# Patient Record
Sex: FEMALE | Race: WHITE | NOT HISPANIC OR LATINO
[De-identification: names, ages, dates, MRNs, and addresses within clinical notes are randomized per-mention and may not be internally consistent; named-entity substitution may affect disease eponyms.]

---

## 2017-04-19 ENCOUNTER — RESULT REVIEW (OUTPATIENT)
Age: 77
End: 2017-04-19

## 2017-06-16 ENCOUNTER — OTHER (OUTPATIENT)
Age: 77
End: 2017-06-16

## 2018-02-16 ENCOUNTER — APPOINTMENT (OUTPATIENT)
Dept: CARDIOLOGY | Facility: CLINIC | Age: 78
End: 2018-02-16
Payer: MEDICARE

## 2018-02-16 ENCOUNTER — NON-APPOINTMENT (OUTPATIENT)
Age: 78
End: 2018-02-16

## 2018-02-16 VITALS
HEART RATE: 67 BPM | BODY MASS INDEX: 22.22 KG/M2 | OXYGEN SATURATION: 99 % | DIASTOLIC BLOOD PRESSURE: 65 MMHG | SYSTOLIC BLOOD PRESSURE: 130 MMHG | WEIGHT: 150 LBS | HEIGHT: 69 IN

## 2018-02-16 DIAGNOSIS — I45.10 UNSPECIFIED RIGHT BUNDLE-BRANCH BLOCK: ICD-10-CM

## 2018-02-16 DIAGNOSIS — R07.9 CHEST PAIN, UNSPECIFIED: ICD-10-CM

## 2018-02-16 DIAGNOSIS — E78.5 HYPERLIPIDEMIA, UNSPECIFIED: ICD-10-CM

## 2018-02-16 PROCEDURE — 99204 OFFICE O/P NEW MOD 45 MIN: CPT

## 2018-02-16 PROCEDURE — 93000 ELECTROCARDIOGRAM COMPLETE: CPT

## 2018-02-16 RX ORDER — METHYLPREDNISOLONE 4 MG/1
4 TABLET ORAL
Qty: 21 | Refills: 0 | Status: COMPLETED | COMMUNITY
Start: 2017-09-07

## 2018-02-16 RX ORDER — IPRATROPIUM BROMIDE 42 UG/1
0.06 SPRAY NASAL
Qty: 15 | Refills: 0 | Status: COMPLETED | COMMUNITY
Start: 2017-08-31

## 2018-02-16 RX ORDER — AMOXICILLIN AND CLAVULANATE POTASSIUM 875; 125 MG/1; MG/1
875-125 TABLET, COATED ORAL
Qty: 10 | Refills: 0 | Status: COMPLETED | COMMUNITY
Start: 2017-08-31

## 2018-02-20 ENCOUNTER — OUTPATIENT (OUTPATIENT)
Dept: OUTPATIENT SERVICES | Facility: HOSPITAL | Age: 78
LOS: 1 days | End: 2018-02-20
Payer: MEDICARE

## 2018-02-20 VITALS
HEIGHT: 69 IN | DIASTOLIC BLOOD PRESSURE: 83 MMHG | OXYGEN SATURATION: 98 % | RESPIRATION RATE: 14 BRPM | HEART RATE: 65 BPM | WEIGHT: 145.06 LBS | TEMPERATURE: 98 F | SYSTOLIC BLOOD PRESSURE: 161 MMHG

## 2018-02-20 DIAGNOSIS — Z98.890 OTHER SPECIFIED POSTPROCEDURAL STATES: Chronic | ICD-10-CM

## 2018-02-20 DIAGNOSIS — I20.0 UNSTABLE ANGINA: ICD-10-CM

## 2018-02-20 LAB
ALBUMIN SERPL ELPH-MCNC: 4.3 G/DL — SIGNIFICANT CHANGE UP (ref 3.3–5)
ALP SERPL-CCNC: 69 U/L — SIGNIFICANT CHANGE UP (ref 40–120)
ALT FLD-CCNC: 21 U/L RC — SIGNIFICANT CHANGE UP (ref 10–45)
ANION GAP SERPL CALC-SCNC: 11 MMOL/L — SIGNIFICANT CHANGE UP (ref 5–17)
AST SERPL-CCNC: 27 U/L — SIGNIFICANT CHANGE UP (ref 10–40)
BILIRUB SERPL-MCNC: 0.3 MG/DL — SIGNIFICANT CHANGE UP (ref 0.2–1.2)
BUN SERPL-MCNC: 17 MG/DL — SIGNIFICANT CHANGE UP (ref 7–23)
CALCIUM SERPL-MCNC: 9.3 MG/DL — SIGNIFICANT CHANGE UP (ref 8.4–10.5)
CHLORIDE SERPL-SCNC: 104 MMOL/L — SIGNIFICANT CHANGE UP (ref 96–108)
CO2 SERPL-SCNC: 28 MMOL/L — SIGNIFICANT CHANGE UP (ref 22–31)
CREAT SERPL-MCNC: 0.98 MG/DL — SIGNIFICANT CHANGE UP (ref 0.5–1.3)
GLUCOSE SERPL-MCNC: 91 MG/DL — SIGNIFICANT CHANGE UP (ref 70–99)
HCT VFR BLD CALC: 41.4 % — SIGNIFICANT CHANGE UP (ref 34.5–45)
HGB BLD-MCNC: 13.5 G/DL — SIGNIFICANT CHANGE UP (ref 11.5–15.5)
MCHC RBC-ENTMCNC: 29.4 PG — SIGNIFICANT CHANGE UP (ref 27–34)
MCHC RBC-ENTMCNC: 32.5 GM/DL — SIGNIFICANT CHANGE UP (ref 32–36)
MCV RBC AUTO: 90.4 FL — SIGNIFICANT CHANGE UP (ref 80–100)
PLATELET # BLD AUTO: 220 K/UL — SIGNIFICANT CHANGE UP (ref 150–400)
POTASSIUM SERPL-MCNC: 4.2 MMOL/L — SIGNIFICANT CHANGE UP (ref 3.5–5.3)
POTASSIUM SERPL-SCNC: 4.2 MMOL/L — SIGNIFICANT CHANGE UP (ref 3.5–5.3)
PROT SERPL-MCNC: 7.3 G/DL — SIGNIFICANT CHANGE UP (ref 6–8.3)
RBC # BLD: 4.58 M/UL — SIGNIFICANT CHANGE UP (ref 3.8–5.2)
RBC # FLD: 11.4 % — SIGNIFICANT CHANGE UP (ref 10.3–14.5)
SODIUM SERPL-SCNC: 143 MMOL/L — SIGNIFICANT CHANGE UP (ref 135–145)
WBC # BLD: 4.9 K/UL — SIGNIFICANT CHANGE UP (ref 3.8–10.5)
WBC # FLD AUTO: 4.9 K/UL — SIGNIFICANT CHANGE UP (ref 3.8–10.5)

## 2018-02-20 PROCEDURE — 93458 L HRT ARTERY/VENTRICLE ANGIO: CPT

## 2018-02-20 PROCEDURE — 80053 COMPREHEN METABOLIC PANEL: CPT

## 2018-02-20 PROCEDURE — 99152 MOD SED SAME PHYS/QHP 5/>YRS: CPT

## 2018-02-20 PROCEDURE — C1887: CPT

## 2018-02-20 PROCEDURE — C1769: CPT

## 2018-02-20 PROCEDURE — 85027 COMPLETE CBC AUTOMATED: CPT

## 2018-02-20 PROCEDURE — C1894: CPT

## 2018-02-20 PROCEDURE — 93458 L HRT ARTERY/VENTRICLE ANGIO: CPT | Mod: 26

## 2018-02-20 RX ORDER — SIMVASTATIN 20 MG/1
1 TABLET, FILM COATED ORAL
Qty: 0 | Refills: 0 | COMMUNITY

## 2018-02-20 RX ORDER — ASPIRIN/CALCIUM CARB/MAGNESIUM 324 MG
1 TABLET ORAL
Qty: 0 | Refills: 0 | COMMUNITY

## 2018-02-20 RX ORDER — LEVOTHYROXINE SODIUM 125 MCG
1 TABLET ORAL
Qty: 0 | Refills: 0 | COMMUNITY

## 2018-02-20 NOTE — H&P CARDIOLOGY - HISTORY OF PRESENT ILLNESS
78 yo female PMH hypothyroidism, breast CA s/p mastectomy, osteoporosis and HLD presents today for cardiac cath. Patient reports intermittent episodes of midsternal chest pressure with exertion x 2-3 months, mostly when participating in aerobic exercise and swimming. Symptoms resolves with rest and intermittently radiated to BL UE. Patient was seen and evaluated by Dr. Joseph ([facility])- recommended cardiac cath for further evaluation for obstructive CAD. Patient currently denies chest pain, palpitations, SOB, PND, orthopnea. 78 yo female PMH hypothyroidism, breast CA s/p mastectomy, osteoporosis and HLD presents today for cardiac cath. Patient reports intermittent episodes of midsternal chest pressure with exertion x 2-3 months, mostly when participating in aerobic exercise and swimming. Symptoms resolves with rest and intermittently radiated to BL UE. Patient was seen and evaluated by Dr. Joseph ([facility])- recommended cardiac cath for further evaluation for obstructive CAD. Patient currently denies chest pain, palpitations, SOB, PND, orthopnea.    Patient reports stress test 2 years- reports negative results- does not recall the cardiologist's name 78 yo female PMH hypothyroidism, breast CA s/p mastectomy, osteoporosis and HLD presents today for cardiac cath. Patient reports intermittent episodes of midsternal chest pressure with exertion x 2-3 months, mostly when participating in aerobic exercise and swimming. Symptoms resolves with rest and intermittently radiated to BL UE. Patient was seen and evaluated by Dr. Joseph ([facility])- recommended cardiac cath for further evaluation for obstructive CAD. Patient currently denies chest pain, palpitations, SOB, PND, orthopnea.  patient reports cold like symptoms started on last Friday  Patient reports stress test 2 years- reports negative results- does not recall the cardiologist's name

## 2018-02-20 NOTE — H&P CARDIOLOGY - PMH
Breast cancer    CAD (coronary artery disease)    HLD (hyperlipidemia)    Hypothyroidism    Osteoporosis

## 2018-02-20 NOTE — H&P CARDIOLOGY - PSH
H/O abdominal hysterectomy    H/O mastectomy    History of hip surgery H/O abdominal hysterectomy  total  H/O mastectomy    History of hip surgery  left hip  2012

## 2018-03-02 ENCOUNTER — APPOINTMENT (OUTPATIENT)
Dept: CARDIOLOGY | Facility: CLINIC | Age: 78
End: 2018-03-02
Payer: MEDICARE

## 2018-03-02 PROCEDURE — 93306 TTE W/DOPPLER COMPLETE: CPT

## 2018-03-21 ENCOUNTER — NON-APPOINTMENT (OUTPATIENT)
Age: 78
End: 2018-03-21

## 2018-03-21 ENCOUNTER — TRANSCRIPTION ENCOUNTER (OUTPATIENT)
Age: 78
End: 2018-03-21

## 2018-03-21 ENCOUNTER — APPOINTMENT (OUTPATIENT)
Dept: CARDIOLOGY | Facility: CLINIC | Age: 78
End: 2018-03-21
Payer: MEDICARE

## 2018-03-21 VITALS
WEIGHT: 150 LBS | BODY MASS INDEX: 22.22 KG/M2 | SYSTOLIC BLOOD PRESSURE: 134 MMHG | OXYGEN SATURATION: 99 % | HEIGHT: 69 IN | DIASTOLIC BLOOD PRESSURE: 70 MMHG | HEART RATE: 60 BPM

## 2018-03-21 PROCEDURE — 93000 ELECTROCARDIOGRAM COMPLETE: CPT

## 2018-03-21 PROCEDURE — 99214 OFFICE O/P EST MOD 30 MIN: CPT

## 2018-04-23 ENCOUNTER — APPOINTMENT (OUTPATIENT)
Dept: CARDIOLOGY | Facility: CLINIC | Age: 78
End: 2018-04-23
Payer: MEDICARE

## 2018-04-23 ENCOUNTER — NON-APPOINTMENT (OUTPATIENT)
Age: 78
End: 2018-04-23

## 2018-04-23 VITALS
BODY MASS INDEX: 21.92 KG/M2 | OXYGEN SATURATION: 91 % | HEIGHT: 69 IN | DIASTOLIC BLOOD PRESSURE: 76 MMHG | SYSTOLIC BLOOD PRESSURE: 149 MMHG | HEART RATE: 71 BPM | WEIGHT: 148 LBS

## 2018-04-23 DIAGNOSIS — R06.02 SHORTNESS OF BREATH: ICD-10-CM

## 2018-04-23 DIAGNOSIS — M79.606 PAIN IN LEG, UNSPECIFIED: ICD-10-CM

## 2018-04-23 DIAGNOSIS — R94.31 ABNORMAL ELECTROCARDIOGRAM [ECG] [EKG]: ICD-10-CM

## 2018-04-23 PROCEDURE — 99214 OFFICE O/P EST MOD 30 MIN: CPT

## 2018-04-23 PROCEDURE — 93000 ELECTROCARDIOGRAM COMPLETE: CPT

## 2018-04-26 ENCOUNTER — APPOINTMENT (OUTPATIENT)
Dept: CARDIOLOGY | Facility: CLINIC | Age: 78
End: 2018-04-26
Payer: MEDICARE

## 2018-04-26 PROCEDURE — 93970 EXTREMITY STUDY: CPT

## 2018-04-27 ENCOUNTER — RESULT REVIEW (OUTPATIENT)
Age: 78
End: 2018-04-27

## 2018-07-23 PROBLEM — R94.31 ABNORMAL ELECTROCARDIOGRAM: Status: ACTIVE | Noted: 2018-04-23

## 2018-08-09 PROBLEM — E78.5 HYPERLIPIDEMIA, UNSPECIFIED: Chronic | Status: ACTIVE | Noted: 2018-02-20

## 2018-08-09 PROBLEM — M81.0 AGE-RELATED OSTEOPOROSIS WITHOUT CURRENT PATHOLOGICAL FRACTURE: Chronic | Status: ACTIVE | Noted: 2018-02-20

## 2018-08-09 PROBLEM — C50.919 MALIGNANT NEOPLASM OF UNSPECIFIED SITE OF UNSPECIFIED FEMALE BREAST: Chronic | Status: ACTIVE | Noted: 2018-02-20

## 2018-08-09 PROBLEM — I25.10 ATHEROSCLEROTIC HEART DISEASE OF NATIVE CORONARY ARTERY WITHOUT ANGINA PECTORIS: Chronic | Status: ACTIVE | Noted: 2018-02-20

## 2018-08-09 PROBLEM — E03.9 HYPOTHYROIDISM, UNSPECIFIED: Chronic | Status: ACTIVE | Noted: 2018-02-20

## 2018-08-13 ENCOUNTER — NON-APPOINTMENT (OUTPATIENT)
Age: 78
End: 2018-08-13

## 2018-08-13 ENCOUNTER — APPOINTMENT (OUTPATIENT)
Dept: CARDIOLOGY | Facility: CLINIC | Age: 78
End: 2018-08-13
Payer: MEDICARE

## 2018-08-13 VITALS — DIASTOLIC BLOOD PRESSURE: 77 MMHG | SYSTOLIC BLOOD PRESSURE: 134 MMHG

## 2018-08-13 VITALS — HEIGHT: 69 IN | WEIGHT: 147 LBS | OXYGEN SATURATION: 96 % | HEART RATE: 55 BPM | BODY MASS INDEX: 21.77 KG/M2

## 2018-08-13 PROCEDURE — 99214 OFFICE O/P EST MOD 30 MIN: CPT

## 2018-08-13 PROCEDURE — 93000 ELECTROCARDIOGRAM COMPLETE: CPT

## 2018-10-09 ENCOUNTER — APPOINTMENT (OUTPATIENT)
Dept: CARDIOLOGY | Facility: CLINIC | Age: 78
End: 2018-10-09
Payer: MEDICARE

## 2018-10-09 PROCEDURE — 93306 TTE W/DOPPLER COMPLETE: CPT

## 2019-03-19 ENCOUNTER — TRANSCRIPTION ENCOUNTER (OUTPATIENT)
Age: 79
End: 2019-03-19

## 2019-03-25 ENCOUNTER — NON-APPOINTMENT (OUTPATIENT)
Age: 79
End: 2019-03-25

## 2019-03-25 ENCOUNTER — APPOINTMENT (OUTPATIENT)
Dept: CARDIOLOGY | Facility: CLINIC | Age: 79
End: 2019-03-25
Payer: MEDICARE

## 2019-03-25 VITALS
OXYGEN SATURATION: 97 % | SYSTOLIC BLOOD PRESSURE: 127 MMHG | BODY MASS INDEX: 21.77 KG/M2 | HEIGHT: 69 IN | DIASTOLIC BLOOD PRESSURE: 65 MMHG | HEART RATE: 55 BPM | WEIGHT: 147 LBS

## 2019-03-25 PROCEDURE — 93000 ELECTROCARDIOGRAM COMPLETE: CPT

## 2019-03-25 PROCEDURE — 99214 OFFICE O/P EST MOD 30 MIN: CPT

## 2019-03-25 NOTE — REVIEW OF SYSTEMS
[Fever] : no fever [Headache] : no headache [Recent Weight Gain (___ Lbs)] : no recent weight gain [Chills] : no chills [Feeling Fatigued] : feeling fatigued [Recent Weight Loss (___ Lbs)] : no recent weight loss [see HPI] : see HPI [Negative] : Heme/Lymph

## 2019-03-25 NOTE — DISCUSSION/SUMMARY
[___ Month(s)] : [unfilled] month(s) [With Me] : with me [FreeTextEntry1] : Mrs. Hinojosa is a 78-year-old female with breast CA status post mastectomy, osteoporosis, hypothyroidism, and hyperlipidemia, here for follow up evaluation.\par At prior visit, she was sent for a cardiac cath because of exertional symptoms, and was found to have non-obstructive CAD. An echocardiogram showed bileaflet MV prolapse with mild-mod MR, at least mod AI and normal LV function. This was confirmed on more recent echocardiogram in 10/2018.\par \par Her EKG is a sinus rhythm with a right bundle branch block, and no sign of ischemia. Her blood pressure has been at goal.\par \par Her AI seems to be in the moderate range, and her LV function is normal. She may have elevated left sided pressures from this though, which is why she has felt better with Lasix. We again discussed the option of pursuing a OC to further evaluate her Aortic regurgitation and mitral valve prolapse. This will be scheduled when she returns from [facility].\par She will call with any issues or new symptoms. She will continue to aspirin and her statin for her nonobstructive CAD. She will followup with me in 3 months.\par There is no cardiac contraindication for her to start exercising in her exercise program.\par

## 2019-03-25 NOTE — HISTORY OF PRESENT ILLNESS
[FreeTextEntry1] : Mrs. Hinojosa is a 78-year-old female with breast CA status post mastectomy, osteoporosis, hypothyroidism, and hyperlipidemia, here for follow up evaluation.\par \par  At prior visits, she reported a decrease in her exercise tolerance and exertional chest pain. She was sent for a cardiac catheterization which showed nonobstructive CAD, with a 20% mid LAD lesion. She was also sent for echocardiogram which showed bileaflet mitral valve prolapse with mild to moderate mitral regurgitation, and moderate aortic regurgitation. Her left ventricle systolic function was normal.\par Because of her shortness of breath I started her on Lasix 20 mg p.o. daily. She began to feel better and lost 3 pounds. \par \par I last saw her in 8/2018. Since this time, she is feeling well. She reports lower extremity swelling, when she is on her feet all day. This usually resolves with leg elevation. She if off Lasix\par She denies chest pain and difficulty breathing at current time. She does report occasional shortness of breath on exertion, and is not able to swim as much.\par She is generally very active person. Repeat echo in 10/2018 with at least moderate AI, and mild- moderate MR.\par \par She denies toxic habits. She is a nonsmoker. She currently takes simvastatin for her hyperlipidemia. There is a strong family history of coronary artery disease

## 2019-03-25 NOTE — PHYSICAL EXAM
[General Appearance - Well Developed] : well developed [Normal Appearance] : normal appearance [Well Groomed] : well groomed [General Appearance - Well Nourished] : well nourished [No Deformities] : no deformities [General Appearance - In No Acute Distress] : no acute distress [Normal Conjunctiva] : the conjunctiva exhibited no abnormalities [Eyelids - No Xanthelasma] : the eyelids demonstrated no xanthelasmas [Normal Oral Mucosa] : normal oral mucosa [No Oral Pallor] : no oral pallor [No Oral Cyanosis] : no oral cyanosis [Normal Jugular Venous A Waves Present] : normal jugular venous A waves present [Normal Jugular Venous V Waves Present] : normal jugular venous V waves present [No Jugular Venous Caal A Waves] : no jugular venous caal A waves [Respiration, Rhythm And Depth] : normal respiratory rhythm and effort [Exaggerated Use Of Accessory Muscles For Inspiration] : no accessory muscle use [Auscultation Breath Sounds / Voice Sounds] : lungs were clear to auscultation bilaterally [Abdomen Soft] : soft [Abdomen Tenderness] : non-tender [Abdomen Mass (___ Cm)] : no abdominal mass palpated [Abnormal Walk] : normal gait [Gait - Sufficient For Exercise Testing] : the gait was sufficient for exercise testing [Nail Clubbing] : no clubbing of the fingernails [Cyanosis, Localized] : no localized cyanosis [Petechial Hemorrhages (___cm)] : no petechial hemorrhages [Skin Color & Pigmentation] : normal skin color and pigmentation [] : no rash [No Venous Stasis] : no venous stasis [Skin Lesions] : no skin lesions [No Skin Ulcers] : no skin ulcer [No Xanthoma] : no  xanthoma was observed [Oriented To Time, Place, And Person] : oriented to person, place, and time [Affect] : the affect was normal [Mood] : the mood was normal [No Anxiety] : not feeling anxious [Normal Rate] : normal [Normal S1] : normal S1 [Normal S2] : normal S2 [S3] : no S3 [S4] : no S4 [II] : a grade 2 [Right Carotid Bruit] : no bruit heard over the right carotid [Left Carotid Bruit] : no bruit heard over the left carotid [Right Femoral Bruit] : no bruit heard over the right femoral artery [Left Femoral Bruit] : no bruit heard over the left femoral artery [2+] : left 2+ [No Abnormalities] : the abdominal aorta was not enlarged and no bruit was heard [___ +] : bilateral [unfilled]U+ pitting edema to the ankles

## 2019-04-12 ENCOUNTER — APPOINTMENT (OUTPATIENT)
Dept: CV DIAGNOSITCS | Facility: HOSPITAL | Age: 79
End: 2019-04-12

## 2019-04-12 ENCOUNTER — OUTPATIENT (OUTPATIENT)
Dept: OUTPATIENT SERVICES | Facility: HOSPITAL | Age: 79
LOS: 1 days | End: 2019-04-12
Payer: MEDICARE

## 2019-04-12 DIAGNOSIS — Z98.890 OTHER SPECIFIED POSTPROCEDURAL STATES: Chronic | ICD-10-CM

## 2019-04-12 DIAGNOSIS — I25.10 ATHEROSCLEROTIC HEART DISEASE OF NATIVE CORONARY ARTERY WITHOUT ANGINA PECTORIS: ICD-10-CM

## 2019-04-12 PROCEDURE — 93306 TTE W/DOPPLER COMPLETE: CPT | Mod: 26

## 2019-04-12 PROCEDURE — 93306 TTE W/DOPPLER COMPLETE: CPT

## 2019-04-12 PROCEDURE — 93312 ECHO TRANSESOPHAGEAL: CPT | Mod: 26

## 2019-04-12 PROCEDURE — 93312 ECHO TRANSESOPHAGEAL: CPT

## 2019-05-20 ENCOUNTER — RESULT REVIEW (OUTPATIENT)
Age: 79
End: 2019-05-20

## 2020-04-02 ENCOUNTER — APPOINTMENT (OUTPATIENT)
Dept: CARDIOLOGY | Facility: CLINIC | Age: 80
End: 2020-04-02

## 2020-05-26 LAB
BASOPHILS # BLD AUTO: 0.03 K/UL
BASOPHILS NFR BLD AUTO: 0.6 %
EOSINOPHIL # BLD AUTO: 0.25 K/UL
EOSINOPHIL NFR BLD AUTO: 4.7 %
HCT VFR BLD CALC: 37.8 %
HGB BLD-MCNC: 12.4 G/DL
IMM GRANULOCYTES NFR BLD AUTO: 0.4 %
LYMPHOCYTES # BLD AUTO: 2.59 K/UL
LYMPHOCYTES NFR BLD AUTO: 48.3 %
MAN DIFF?: NORMAL
MCHC RBC-ENTMCNC: 29 PG
MCHC RBC-ENTMCNC: 32.8 GM/DL
MCV RBC AUTO: 88.5 FL
MONOCYTES # BLD AUTO: 0.42 K/UL
MONOCYTES NFR BLD AUTO: 7.8 %
NEUTROPHILS # BLD AUTO: 2.05 K/UL
NEUTROPHILS NFR BLD AUTO: 38.2 %
PLATELET # BLD AUTO: 263 K/UL
RBC # BLD: 4.27 M/UL
RBC # FLD: 12.3 %
SARS-COV-2 IGG SERPL IA-ACNC: 0.1 INDEX
SARS-COV-2 IGG SERPL QL IA: NEGATIVE
WBC # FLD AUTO: 5.36 K/UL

## 2020-05-27 LAB
25(OH)D3 SERPL-MCNC: 33 NG/ML
ALBUMIN SERPL ELPH-MCNC: 4.3 G/DL
ALP BLD-CCNC: 67 U/L
ALT SERPL-CCNC: 16 U/L
ANION GAP SERPL CALC-SCNC: 12 MMOL/L
AST SERPL-CCNC: 23 U/L
BILIRUB SERPL-MCNC: 0.3 MG/DL
BUN SERPL-MCNC: 19 MG/DL
CALCIUM SERPL-MCNC: 8.9 MG/DL
CHLORIDE SERPL-SCNC: 104 MMOL/L
CHOLEST SERPL-MCNC: 184 MG/DL
CHOLEST/HDLC SERPL: 1.9 RATIO
CO2 SERPL-SCNC: 24 MMOL/L
CREAT SERPL-MCNC: 0.92 MG/DL
ESTIMATED AVERAGE GLUCOSE: 111 MG/DL
GLUCOSE SERPL-MCNC: 96 MG/DL
HBA1C MFR BLD HPLC: 5.5 %
HDLC SERPL-MCNC: 96 MG/DL
LDLC SERPL CALC-MCNC: 79 MG/DL
POTASSIUM SERPL-SCNC: 4 MMOL/L
PROT SERPL-MCNC: 6.1 G/DL
SODIUM SERPL-SCNC: 141 MMOL/L
TRIGL SERPL-MCNC: 48 MG/DL
TSH SERPL-ACNC: 5.95 UIU/ML

## 2020-06-23 ENCOUNTER — APPOINTMENT (OUTPATIENT)
Dept: CARDIOLOGY | Facility: CLINIC | Age: 80
End: 2020-06-23
Payer: MEDICARE

## 2020-06-23 ENCOUNTER — NON-APPOINTMENT (OUTPATIENT)
Age: 80
End: 2020-06-23

## 2020-06-23 VITALS
BODY MASS INDEX: 21.18 KG/M2 | OXYGEN SATURATION: 97 % | SYSTOLIC BLOOD PRESSURE: 112 MMHG | DIASTOLIC BLOOD PRESSURE: 67 MMHG | HEIGHT: 69 IN | HEART RATE: 57 BPM | WEIGHT: 143 LBS

## 2020-06-23 PROCEDURE — 93000 ELECTROCARDIOGRAM COMPLETE: CPT

## 2020-06-23 PROCEDURE — 99214 OFFICE O/P EST MOD 30 MIN: CPT

## 2020-06-23 NOTE — REVIEW OF SYSTEMS
[Fever] : no fever [Headache] : no headache [Recent Weight Gain (___ Lbs)] : no recent weight gain [Chills] : no chills [see HPI] : see HPI [Feeling Fatigued] : feeling fatigued [Recent Weight Loss (___ Lbs)] : no recent weight loss [Negative] : Heme/Lymph

## 2020-06-23 NOTE — HISTORY OF PRESENT ILLNESS
[FreeTextEntry1] : Mrs. Hinojosa is a 79-year-old female with breast CA status post mastectomy, osteoporosis, hypothyroidism, and hyperlipidemia, here for follow up evaluation.\par \par  At prior visits, she reported a decrease in her exercise tolerance and exertional chest pain. She was sent for a cardiac catheterization which showed nonobstructive CAD, with a 20% mid LAD lesion. She was also sent for echocardiogram which showed bileaflet mitral valve prolapse with mild to moderate mitral regurgitation, and moderate aortic regurgitation. Her left ventricle systolic function was normal.\par \par I last saw her in 9/2019. \par She denies chest pain and difficulty breathing at current time. She has not been swimming because of the pandemic. She does report some fatigue.\par She is generally very active person. Repeat echo in 10/2018 with at least moderate AI, and mild- moderate MR. OC with moderate AR and mild MR with bileaflet prolapse.\par Since last visit, she did have a episode of chest tightness that woke her up. She went to see a cardiologist while I was redeployed, and reports a normal pharmacologic stress test. 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 Recent blood work was notable for an LDL of 79.\par \par She denies toxic habits. She is a nonsmoker. She currently takes simvastatin for her hyperlipidemia. There is a strong family history of coronary artery disease

## 2020-06-23 NOTE — PHYSICAL EXAM
[Normal Appearance] : normal appearance [General Appearance - Well Developed] : well developed [General Appearance - Well Nourished] : well nourished [No Deformities] : no deformities [Well Groomed] : well groomed [Normal Conjunctiva] : the conjunctiva exhibited no abnormalities [General Appearance - In No Acute Distress] : no acute distress [Eyelids - No Xanthelasma] : the eyelids demonstrated no xanthelasmas [Normal Oral Mucosa] : normal oral mucosa [No Oral Cyanosis] : no oral cyanosis [No Oral Pallor] : no oral pallor [Normal Jugular Venous V Waves Present] : normal jugular venous V waves present [Normal Jugular Venous A Waves Present] : normal jugular venous A waves present [Respiration, Rhythm And Depth] : normal respiratory rhythm and effort [No Jugular Venous Caal A Waves] : no jugular venous caal A waves [Exaggerated Use Of Accessory Muscles For Inspiration] : no accessory muscle use [Auscultation Breath Sounds / Voice Sounds] : lungs were clear to auscultation bilaterally [Abdomen Soft] : soft [Abdomen Tenderness] : non-tender [Abdomen Mass (___ Cm)] : no abdominal mass palpated [Abnormal Walk] : normal gait [Gait - Sufficient For Exercise Testing] : the gait was sufficient for exercise testing [Nail Clubbing] : no clubbing of the fingernails [Cyanosis, Localized] : no localized cyanosis [Petechial Hemorrhages (___cm)] : no petechial hemorrhages [Skin Color & Pigmentation] : normal skin color and pigmentation [No Venous Stasis] : no venous stasis [] : no rash [Skin Lesions] : no skin lesions [No Skin Ulcers] : no skin ulcer [No Xanthoma] : no  xanthoma was observed [Oriented To Time, Place, And Person] : oriented to person, place, and time [Affect] : the affect was normal [Mood] : the mood was normal [No Anxiety] : not feeling anxious [Normal S1] : normal S1 [Normal Rate] : normal [Normal S2] : normal S2 [S4] : no S4 [S3] : no S3 [II] : a grade 2 [I] : a grade 1 [Left Carotid Bruit] : no bruit heard over the left carotid [Right Carotid Bruit] : no bruit heard over the right carotid [Right Femoral Bruit] : no bruit heard over the right femoral artery [Left Femoral Bruit] : no bruit heard over the left femoral artery [2+] : right 2+ [No Pitting Edema] : no pitting edema present [No Abnormalities] : the abdominal aorta was not enlarged and no bruit was heard

## 2020-06-23 NOTE — DISCUSSION/SUMMARY
[___ Month(s)] : [unfilled] month(s) [With Me] : with me [FreeTextEntry1] : Mrs. Hinojosa is a 80-year-old female with breast CA status post mastectomy, osteoporosis, hypothyroidism, and hyperlipidemia, here for follow up evaluation.\par At prior visit, she was sent for a cardiac cath because of exertional symptoms, and was found to have non-obstructive CAD. She has moderate AR and mild MR on a OC in 2019.\par \par Her EKG is a sinus rhythm with a right bundle branch block, and no sign of ischemia, unchanged from previous tracings. Her blood pressure has been at goal.\par \par She will get me the records of her recent stress test. If she had a repeat echocardiogram, we will not repeat one at this time. If none done, she will have a repeat to evaluate for changes in LV function and LV dimensions in the setting of AR.\par \par She will call with any issues or new symptoms. She will continue to aspirin and her statin for her nonobstructive CAD. Her most recent LDL was at goal\par I will her again in 6 months time.

## 2020-09-18 ENCOUNTER — RESULT REVIEW (OUTPATIENT)
Age: 80
End: 2020-09-18

## 2020-09-18 ENCOUNTER — APPOINTMENT (OUTPATIENT)
Dept: CARDIOLOGY | Facility: CLINIC | Age: 80
End: 2020-09-18
Payer: MEDICARE

## 2020-09-18 PROCEDURE — 93306 TTE W/DOPPLER COMPLETE: CPT

## 2020-12-08 ENCOUNTER — NON-APPOINTMENT (OUTPATIENT)
Age: 80
End: 2020-12-08

## 2020-12-08 ENCOUNTER — APPOINTMENT (OUTPATIENT)
Dept: CARDIOLOGY | Facility: CLINIC | Age: 80
End: 2020-12-08
Payer: MEDICARE

## 2020-12-08 VITALS
DIASTOLIC BLOOD PRESSURE: 62 MMHG | SYSTOLIC BLOOD PRESSURE: 148 MMHG | OXYGEN SATURATION: 100 % | HEIGHT: 69 IN | BODY MASS INDEX: 21.48 KG/M2 | HEART RATE: 48 BPM | WEIGHT: 145 LBS

## 2020-12-08 DIAGNOSIS — R94.31 ABNORMAL ELECTROCARDIOGRAM [ECG] [EKG]: ICD-10-CM

## 2020-12-08 DIAGNOSIS — I35.1 NONRHEUMATIC AORTIC (VALVE) INSUFFICIENCY: ICD-10-CM

## 2020-12-08 PROCEDURE — 99214 OFFICE O/P EST MOD 30 MIN: CPT

## 2020-12-08 PROCEDURE — 93000 ELECTROCARDIOGRAM COMPLETE: CPT

## 2020-12-08 NOTE — REVIEW OF SYSTEMS
[Fever] : no fever [Headache] : no headache [Recent Weight Gain (___ Lbs)] : no recent weight gain [Chills] : no chills [Recent Weight Loss (___ Lbs)] : no recent weight loss [see HPI] : see HPI [Negative] : Heme/Lymph

## 2020-12-08 NOTE — PHYSICAL EXAM
[General Appearance - Well Developed] : well developed [Normal Appearance] : normal appearance [Well Groomed] : well groomed [General Appearance - Well Nourished] : well nourished [No Deformities] : no deformities [General Appearance - In No Acute Distress] : no acute distress [Normal Conjunctiva] : the conjunctiva exhibited no abnormalities [Eyelids - No Xanthelasma] : the eyelids demonstrated no xanthelasmas [Normal Oral Mucosa] : normal oral mucosa [No Oral Pallor] : no oral pallor [No Oral Cyanosis] : no oral cyanosis [Normal Jugular Venous A Waves Present] : normal jugular venous A waves present [Normal Jugular Venous V Waves Present] : normal jugular venous V waves present [No Jugular Venous Caal A Waves] : no jugular venous caal A waves [Respiration, Rhythm And Depth] : normal respiratory rhythm and effort [Exaggerated Use Of Accessory Muscles For Inspiration] : no accessory muscle use [Auscultation Breath Sounds / Voice Sounds] : lungs were clear to auscultation bilaterally [Abdomen Soft] : soft [Abdomen Tenderness] : non-tender [Abdomen Mass (___ Cm)] : no abdominal mass palpated [Abnormal Walk] : normal gait [Gait - Sufficient For Exercise Testing] : the gait was sufficient for exercise testing [Nail Clubbing] : no clubbing of the fingernails [Cyanosis, Localized] : no localized cyanosis [Petechial Hemorrhages (___cm)] : no petechial hemorrhages [Skin Color & Pigmentation] : normal skin color and pigmentation [] : no rash [No Venous Stasis] : no venous stasis [Skin Lesions] : no skin lesions [No Skin Ulcers] : no skin ulcer [No Xanthoma] : no  xanthoma was observed [Oriented To Time, Place, And Person] : oriented to person, place, and time [Affect] : the affect was normal [Mood] : the mood was normal [No Anxiety] : not feeling anxious [Normal Rate] : normal [Normal S1] : normal S1 [Normal S2] : normal S2 [S3] : no S3 [S4] : no S4 [II] : a grade 2 [I] : a grade 1 [Right Carotid Bruit] : no bruit heard over the right carotid [Left Carotid Bruit] : no bruit heard over the left carotid [Right Femoral Bruit] : no bruit heard over the right femoral artery [Left Femoral Bruit] : no bruit heard over the left femoral artery [2+] : left 2+ [No Abnormalities] : the abdominal aorta was not enlarged and no bruit was heard [No Pitting Edema] : no pitting edema present

## 2020-12-08 NOTE — HISTORY OF PRESENT ILLNESS
[FreeTextEntry1] : Mrs. Hinojosa is a 80-year-old female with breast CA status post mastectomy, osteoporosis, hypothyroidism, and hyperlipidemia, here for follow up evaluation.\par \par  At prior visits, she reported a decrease in her exercise tolerance and exertional chest pain. She was sent for a cardiac catheterization which showed nonobstructive CAD, with a 20% mid LAD lesion. She was also sent for echocardiogram which showed bileaflet mitral valve prolapse with mild to moderate mitral regurgitation, and moderate aortic regurgitation. Her left ventricle systolic function was normal.\par \par I last saw her in 6/2020.\par She denies chest pain and difficulty breathing at current time. She is back to swimming.\par She is generally very active person. Repeat echo in 9/020 with at moderate AI, and moderate MR with bileaflet prolapse. LV dimensions and LV function were normal.\par \par She did have chest pain earlier in 2020 episode of chest tightness that woke her up. She went to see a cardiologist while I was redeployed, and reports a normal pharmacologic stress test. 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 Recent blood work was notable for an LDL of 79.\par \par She denies toxic habits. She is a nonsmoker. She currently takes simvastatin for her hyperlipidemia. There is a strong family history of coronary artery disease

## 2020-12-08 NOTE — DISCUSSION/SUMMARY
[___ Month(s)] : [unfilled] month(s) [With Me] : with me [FreeTextEntry1] : Mrs. Hinojosa is a 80-year-old female with breast CA status post mastectomy, osteoporosis, hypothyroidism, and hyperlipidemia, here for follow up evaluation.\par \par At prior visit, she was sent for a cardiac cath because of exertional symptoms, and was found to have non-obstructive CAD. She has moderate AR and moderate MR on recent echocardiogram with normal LV function and wall dimensions.\par \par Her EKG is a sinus rhythm with a right bundle branch block, and no sign of ischemia, unchanged from previous tracings. Her blood pressure is elevated, though this seems to be an aberration. She will check her numbers at home.\par \par She will call with any issues or new symptoms. She will continue to aspirin and her statin for her nonobstructive CAD. Her most recent LDL was at goal.\par I will her again in 6 months time.

## 2021-05-11 ENCOUNTER — APPOINTMENT (OUTPATIENT)
Dept: CARDIOLOGY | Facility: CLINIC | Age: 81
End: 2021-05-11
Payer: MEDICARE

## 2021-05-11 ENCOUNTER — NON-APPOINTMENT (OUTPATIENT)
Age: 81
End: 2021-05-11

## 2021-05-11 VITALS
HEIGHT: 69 IN | SYSTOLIC BLOOD PRESSURE: 144 MMHG | WEIGHT: 148 LBS | DIASTOLIC BLOOD PRESSURE: 75 MMHG | OXYGEN SATURATION: 98 % | BODY MASS INDEX: 21.92 KG/M2 | HEART RATE: 58 BPM

## 2021-05-11 VITALS — DIASTOLIC BLOOD PRESSURE: 73 MMHG | SYSTOLIC BLOOD PRESSURE: 128 MMHG

## 2021-05-11 DIAGNOSIS — R09.89 OTHER SPECIFIED SYMPTOMS AND SIGNS INVOLVING THE CIRCULATORY AND RESPIRATORY SYSTEMS: ICD-10-CM

## 2021-05-11 DIAGNOSIS — I65.29 OCCLUSION AND STENOSIS OF UNSPECIFIED CAROTID ARTERY: ICD-10-CM

## 2021-05-11 PROCEDURE — 93000 ELECTROCARDIOGRAM COMPLETE: CPT

## 2021-05-11 PROCEDURE — 99214 OFFICE O/P EST MOD 30 MIN: CPT

## 2021-05-11 NOTE — DISCUSSION/SUMMARY
[With Me] : with me [___ Month(s)] : in [unfilled] month(s) [FreeTextEntry1] : Mrs. Hinojosa is a 80-year-old female with breast CA status post mastectomy, osteoporosis, hypothyroidism, and hyperlipidemia, here for follow up evaluation.\par \par At prior visit, she was sent for a cardiac cath because of exertional symptoms, and was found to have non-obstructive CAD. She has moderate AR and moderate MR on recent echocardiogram with normal LV function and wall dimensions.\par Her EKG is a sinus rhythm with a right bundle branch block, and no sign of ischemia, unchanged from previous tracings. Her blood pressure is better controlled.\par \par She will call with any issues or new symptoms. She will continue to aspirin and her statin for her nonobstructive CAD and carotid disease. Her most recent LDL was at goal.\par \par She does report some claudication like leg heaviness of her LLE, and her pulse is less palpable than her right leg. She will have a le arterial doppler for further evaluation.\par We will repeat her carotids and echocardiogram this summer.\par I will see her again in 3 months.

## 2021-05-11 NOTE — PHYSICAL EXAM
[Well Developed] : well developed [Well Nourished] : well nourished [No Acute Distress] : no acute distress [Normal Conjunctiva] : normal conjunctiva [Normal Venous Pressure] : normal venous pressure [No Carotid Bruit] : no carotid bruit [Normal Rate] : normal [Normal S1] : normal S1 [Normal S2] : normal S2 [II] : a grade 2 [I] : a grade 1 [No Pitting Edema] : no pitting edema present [2+] : right 2+ [1+] : left 1+ [No Abnormalities] : the abdominal aorta was not enlarged and no bruit was heard [Clear Lung Fields] : clear lung fields [Good Air Entry] : good air entry [No Respiratory Distress] : no respiratory distress  [Soft] : abdomen soft [Non Tender] : non-tender [No Masses/organomegaly] : no masses/organomegaly [Normal Bowel Sounds] : normal bowel sounds [Normal Gait] : normal gait [No Edema] : no edema [No Cyanosis] : no cyanosis [No Clubbing] : no clubbing [No Varicosities] : no varicosities [No Rash] : no rash [No Skin Lesions] : no skin lesions [Moves all extremities] : moves all extremities [No Focal Deficits] : no focal deficits [Normal Speech] : normal speech [Alert and Oriented] : alert and oriented [Normal memory] : normal memory [S3] : no S3 [S4] : no S4 [Right Carotid Bruit] : no bruit heard over the right carotid [Left Carotid Bruit] : no bruit heard over the left carotid [Right Femoral Bruit] : no bruit heard over the right femoral artery [Left Femoral Bruit] : no bruit heard over the left femoral artery

## 2021-05-11 NOTE — HISTORY OF PRESENT ILLNESS
[FreeTextEntry1] : Mrs. Hinojosa is a 80-year-old female with breast CA status post mastectomy, osteoporosis, hypothyroidism, and hyperlipidemia, here for follow up evaluation.\par \par  At prior visits, she reported a decrease in her exercise tolerance and exertional chest pain. She was sent for a cardiac catheterization which showed nonobstructive CAD, with a 20% mid LAD lesion. She was also sent for echocardiogram which showed bileaflet mitral valve prolapse with mild to moderate mitral regurgitation, and moderate aortic regurgitation. Her left ventricle systolic function was normal.\par \par I last saw her in 12/2020.\par She denies chest pain and difficulty breathing at current time. She is back to swimming.\par She is generally very active person. Repeat echo in 9/020 with at moderate AI, and moderate MR with bileaflet prolapse. LV dimensions and LV function were normal.\par \par She did have chest pain earlier in 2020 episode of chest tightness that woke her up. She went to see a cardiologist while I was redeployed, and reports a normal pharmacologic stress test. 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 Recent blood work was notable for an LDL of 79.\par \par She reports some left lower leg heaviness with walking over the last few months, which is new. This has limited her ability to walk long distances.\par \par She denies toxic habits. She is a nonsmoker. She currently takes simvastatin for her hyperlipidemia. There is a strong family history of coronary artery disease

## 2021-05-31 DIAGNOSIS — E04.1 NONTOXIC SINGLE THYROID NODULE: ICD-10-CM

## 2021-06-01 ENCOUNTER — APPOINTMENT (OUTPATIENT)
Dept: CARDIOLOGY | Facility: CLINIC | Age: 81
End: 2021-06-01
Payer: MEDICARE

## 2021-06-01 PROCEDURE — 93880 EXTRACRANIAL BILAT STUDY: CPT

## 2021-06-01 PROCEDURE — 93925 LOWER EXTREMITY STUDY: CPT

## 2021-06-03 PROBLEM — E04.1 THYROID NODULE: Status: ACTIVE | Noted: 2021-06-03

## 2021-07-13 ENCOUNTER — APPOINTMENT (OUTPATIENT)
Dept: ENDOCRINOLOGY | Facility: CLINIC | Age: 81
End: 2021-07-13
Payer: MEDICARE

## 2021-07-13 VITALS
OXYGEN SATURATION: 98 % | BODY MASS INDEX: 21.62 KG/M2 | HEART RATE: 56 BPM | HEIGHT: 69 IN | DIASTOLIC BLOOD PRESSURE: 68 MMHG | WEIGHT: 146 LBS | SYSTOLIC BLOOD PRESSURE: 106 MMHG | RESPIRATION RATE: 15 BRPM | TEMPERATURE: 98.1 F

## 2021-07-13 DIAGNOSIS — E03.9 HYPOTHYROIDISM, UNSPECIFIED: ICD-10-CM

## 2021-07-13 DIAGNOSIS — E04.2 NONTOXIC MULTINODULAR GOITER: ICD-10-CM

## 2021-07-13 PROCEDURE — 36415 COLL VENOUS BLD VENIPUNCTURE: CPT

## 2021-07-13 PROCEDURE — 99204 OFFICE O/P NEW MOD 45 MIN: CPT | Mod: 25

## 2021-07-13 RX ORDER — FUROSEMIDE 20 MG/1
20 TABLET ORAL
Qty: 30 | Refills: 5 | Status: COMPLETED | COMMUNITY
Start: 2018-03-08 | End: 2021-07-13

## 2021-07-16 ENCOUNTER — NON-APPOINTMENT (OUTPATIENT)
Age: 81
End: 2021-07-16

## 2021-07-16 LAB
T3 SERPL-MCNC: 102 NG/DL
T4 FREE SERPL-MCNC: 1.4 NG/DL
THYROGLOB AB SERPL-ACNC: <20 IU/ML
THYROPEROXIDASE AB SERPL IA-ACNC: <10 IU/ML
TSH SERPL-ACNC: 2.86 UIU/ML
TSI ACT/NOR SER: <0.1 IU/L

## 2021-07-20 LAB — TSH RECEPTOR AB: <1.1 IU/L

## 2021-07-21 ENCOUNTER — APPOINTMENT (OUTPATIENT)
Dept: ULTRASOUND IMAGING | Facility: IMAGING CENTER | Age: 81
End: 2021-07-21
Payer: MEDICARE

## 2021-07-21 ENCOUNTER — RESULT REVIEW (OUTPATIENT)
Age: 81
End: 2021-07-21

## 2021-07-21 ENCOUNTER — OUTPATIENT (OUTPATIENT)
Dept: OUTPATIENT SERVICES | Facility: HOSPITAL | Age: 81
LOS: 1 days | End: 2021-07-21
Payer: MEDICARE

## 2021-07-21 DIAGNOSIS — E04.1 NONTOXIC SINGLE THYROID NODULE: ICD-10-CM

## 2021-07-21 DIAGNOSIS — E03.9 HYPOTHYROIDISM, UNSPECIFIED: ICD-10-CM

## 2021-07-21 DIAGNOSIS — Z98.890 OTHER SPECIFIED POSTPROCEDURAL STATES: Chronic | ICD-10-CM

## 2021-07-21 PROCEDURE — 88172 CYTP DX EVAL FNA 1ST EA SITE: CPT

## 2021-07-21 PROCEDURE — 88173 CYTOPATH EVAL FNA REPORT: CPT | Mod: 26

## 2021-07-21 PROCEDURE — 88173 CYTOPATH EVAL FNA REPORT: CPT

## 2021-07-21 PROCEDURE — 10005 FNA BX W/US GDN 1ST LES: CPT

## 2021-07-22 LAB — NON-GYNECOLOGICAL CYTOLOGY STUDY: SIGNIFICANT CHANGE UP

## 2021-07-22 PROCEDURE — 10005 FNA BX W/US GDN 1ST LES: CPT

## 2021-07-23 ENCOUNTER — NON-APPOINTMENT (OUTPATIENT)
Age: 81
End: 2021-07-23

## 2021-10-01 ENCOUNTER — RESULT REVIEW (OUTPATIENT)
Age: 81
End: 2021-10-01

## 2022-08-24 ENCOUNTER — NON-APPOINTMENT (OUTPATIENT)
Age: 82
End: 2022-08-24

## 2022-08-24 ENCOUNTER — APPOINTMENT (OUTPATIENT)
Dept: CARDIOLOGY | Facility: CLINIC | Age: 82
End: 2022-08-24

## 2022-08-24 VITALS
HEIGHT: 69 IN | BODY MASS INDEX: 21.03 KG/M2 | SYSTOLIC BLOOD PRESSURE: 117 MMHG | WEIGHT: 142 LBS | HEART RATE: 51 BPM | OXYGEN SATURATION: 98 % | DIASTOLIC BLOOD PRESSURE: 63 MMHG

## 2022-08-24 DIAGNOSIS — R94.31 ABNORMAL ELECTROCARDIOGRAM [ECG] [EKG]: ICD-10-CM

## 2022-08-24 DIAGNOSIS — I35.1 NONRHEUMATIC AORTIC (VALVE) INSUFFICIENCY: ICD-10-CM

## 2022-08-24 PROCEDURE — 93000 ELECTROCARDIOGRAM COMPLETE: CPT

## 2022-08-24 PROCEDURE — 99214 OFFICE O/P EST MOD 30 MIN: CPT

## 2022-08-24 RX ORDER — FUROSEMIDE 20 MG/1
20 TABLET ORAL
Qty: 30 | Refills: 0 | Status: ACTIVE | COMMUNITY
Start: 2022-08-24 | End: 1900-01-01

## 2022-08-24 NOTE — DISCUSSION/SUMMARY
[With Me] : with me [___ Month(s)] : in [unfilled] month(s) [FreeTextEntry1] : Mrs. Hinojosa is a 81-year-old female with breast CA status post mastectomy, osteoporosis, hypothyroidism, and hyperlipidemia, here for follow up evaluation.\par \par At prior visit, she was sent for a cardiac cath because of exertional symptoms, and was found to have non-obstructive CAD. She has moderate AR and moderate MR on echocardiogram in 2020 with normal LV function and wall dimensions.\par \par She is reporting dyspnea, fatigue and some edema. Her EKG shows a SR with ventricular trigeminy.  She has mild edema on exam.\par \par We will repeat her echocardiogram to evaluate her MR and AR, and a 24 hour holter to evaluate her PVC burden. She will try taking lasix 20 mg daily to see if it helps with her breathing and edema. Once the results are available, I may also set her up for a stress test.\par \par We will speak after the above testing and arrange follow up. [EKG obtained to assist in diagnosis and management of assessed problem(s)] : EKG obtained to assist in diagnosis and management of assessed problem(s)

## 2022-08-24 NOTE — HISTORY OF PRESENT ILLNESS
[FreeTextEntry1] : Mrs. Hinojosa is a 81-year-old female with breast CA status post mastectomy, osteoporosis, hypothyroidism, and hyperlipidemia, here for follow up evaluation.\par \par  At prior visits, she reported a decrease in her exercise tolerance and exertional chest pain. She was sent for a cardiac catheterization which showed nonobstructive CAD, with a 20% mid LAD lesion. She was also sent for echocardiogram which showed bileaflet mitral valve prolapse with mild to moderate mitral regurgitation, and moderate aortic regurgitation. Her left ventricle systolic function was normal.\par \par I last saw her in 5/21.\par She is generally very active person. Repeat echo in 9/020 with at moderate AI, and moderate MR with bileaflet prolapse. LV dimensions and LV function were normal.\par \par She is reporting some dyspnea on exertion, fatigue and episodes of dizziness. She has no exertional chest pain. She is also reporting some le edema and skipped heart beats.\par \par LE arterial doppler in 2021 with mild atherosclerosis. Carotids with mild atherosclerosis, though a prior study demonstrated a 50-69% stenosis of her right distal ICA.\par \par She denies toxic habits. She is a nonsmoker. She currently takes simvastatin for her hyperlipidemia. There is a strong family history of coronary artery disease

## 2022-08-24 NOTE — REVIEW OF SYSTEMS
[Negative] : Heme/Lymph [Feeling Fatigued] : feeling fatigued [Dyspnea on exertion] : dyspnea during exertion

## 2022-08-31 ENCOUNTER — APPOINTMENT (OUTPATIENT)
Dept: CARDIOLOGY | Facility: CLINIC | Age: 82
End: 2022-08-31

## 2022-08-31 PROCEDURE — 93306 TTE W/DOPPLER COMPLETE: CPT

## 2022-08-31 PROCEDURE — 93224 XTRNL ECG REC UP TO 48 HRS: CPT

## 2022-09-21 ENCOUNTER — NON-APPOINTMENT (OUTPATIENT)
Age: 82
End: 2022-09-21

## 2022-09-23 ENCOUNTER — APPOINTMENT (OUTPATIENT)
Dept: CARDIOLOGY | Facility: CLINIC | Age: 82
End: 2022-09-23

## 2022-09-23 PROCEDURE — 78452 HT MUSCLE IMAGE SPECT MULT: CPT

## 2022-09-23 PROCEDURE — A9500: CPT

## 2022-09-23 PROCEDURE — 93015 CV STRESS TEST SUPVJ I&R: CPT

## 2022-10-31 ENCOUNTER — RESULT REVIEW (OUTPATIENT)
Age: 82
End: 2022-10-31

## 2022-12-07 ENCOUNTER — OUTPATIENT (OUTPATIENT)
Dept: OUTPATIENT SERVICES | Facility: HOSPITAL | Age: 82
LOS: 1 days | Discharge: HOME | End: 2022-12-07

## 2022-12-07 DIAGNOSIS — Z98.890 OTHER SPECIFIED POSTPROCEDURAL STATES: Chronic | ICD-10-CM

## 2022-12-07 DIAGNOSIS — D64.9 ANEMIA, UNSPECIFIED: ICD-10-CM

## 2022-12-19 ENCOUNTER — RESULT REVIEW (OUTPATIENT)
Age: 82
End: 2022-12-19

## 2022-12-19 ENCOUNTER — APPOINTMENT (OUTPATIENT)
Dept: HEMATOLOGY ONCOLOGY | Facility: CLINIC | Age: 82
End: 2022-12-19

## 2022-12-19 VITALS
HEIGHT: 68.74 IN | OXYGEN SATURATION: 98 % | BODY MASS INDEX: 21.22 KG/M2 | SYSTOLIC BLOOD PRESSURE: 131 MMHG | HEART RATE: 56 BPM | TEMPERATURE: 97.3 F | RESPIRATION RATE: 16 BRPM | DIASTOLIC BLOOD PRESSURE: 71 MMHG | WEIGHT: 143.3 LBS

## 2022-12-19 DIAGNOSIS — U09.9 POST COVID-19 CONDITION, UNSPECIFIED: ICD-10-CM

## 2022-12-19 LAB
25(OH)D3 SERPL-MCNC: 29.7 NG/ML
ALBUMIN SERPL ELPH-MCNC: 4.1 G/DL
ALP BLD-CCNC: 81 U/L
ALT SERPL-CCNC: 23 U/L
ANION GAP SERPL CALC-SCNC: 10 MMOL/L
AST SERPL-CCNC: 26 U/L
BASOPHILS # BLD AUTO: 0.03 K/UL — SIGNIFICANT CHANGE UP (ref 0–0.2)
BASOPHILS NFR BLD AUTO: 0.5 % — SIGNIFICANT CHANGE UP (ref 0–2)
BILIRUB SERPL-MCNC: 0.3 MG/DL
BUN SERPL-MCNC: 24 MG/DL
CALCIUM SERPL-MCNC: 9.2 MG/DL
CHLORIDE SERPL-SCNC: 103 MMOL/L
CO2 SERPL-SCNC: 27 MMOL/L
CREAT SERPL-MCNC: 0.9 MG/DL
EGFR: 64 ML/MIN/1.73M2
EOSINOPHIL # BLD AUTO: 0.13 K/UL — SIGNIFICANT CHANGE UP (ref 0–0.5)
EOSINOPHIL NFR BLD AUTO: 2 % — SIGNIFICANT CHANGE UP (ref 0–6)
GLUCOSE SERPL-MCNC: 78 MG/DL
HCT VFR BLD CALC: 38.3 % — SIGNIFICANT CHANGE UP (ref 34.5–45)
HGB BLD-MCNC: 12.5 G/DL — SIGNIFICANT CHANGE UP (ref 11.5–15.5)
IMM GRANULOCYTES NFR BLD AUTO: 0.2 % — SIGNIFICANT CHANGE UP (ref 0–0.9)
LYMPHOCYTES # BLD AUTO: 2.31 K/UL — SIGNIFICANT CHANGE UP (ref 1–3.3)
LYMPHOCYTES # BLD AUTO: 35.3 % — SIGNIFICANT CHANGE UP (ref 13–44)
MCHC RBC-ENTMCNC: 29.1 PG — SIGNIFICANT CHANGE UP (ref 27–34)
MCHC RBC-ENTMCNC: 32.6 G/DL — SIGNIFICANT CHANGE UP (ref 32–36)
MCV RBC AUTO: 89.3 FL — SIGNIFICANT CHANGE UP (ref 80–100)
MONOCYTES # BLD AUTO: 0.52 K/UL — SIGNIFICANT CHANGE UP (ref 0–0.9)
MONOCYTES NFR BLD AUTO: 8 % — SIGNIFICANT CHANGE UP (ref 2–14)
NEUTROPHILS # BLD AUTO: 3.54 K/UL — SIGNIFICANT CHANGE UP (ref 1.8–7.4)
NEUTROPHILS NFR BLD AUTO: 54 % — SIGNIFICANT CHANGE UP (ref 43–77)
NRBC # BLD: 0 /100 WBCS — SIGNIFICANT CHANGE UP (ref 0–0)
PLATELET # BLD AUTO: 247 K/UL — SIGNIFICANT CHANGE UP (ref 150–400)
POTASSIUM SERPL-SCNC: 4.4 MMOL/L
PROT SERPL-MCNC: 6.3 G/DL
RBC # BLD: 4.29 M/UL — SIGNIFICANT CHANGE UP (ref 3.8–5.2)
RBC # FLD: 12.6 % — SIGNIFICANT CHANGE UP (ref 10.3–14.5)
SODIUM SERPL-SCNC: 141 MMOL/L
WBC # BLD: 6.54 K/UL — SIGNIFICANT CHANGE UP (ref 3.8–10.5)
WBC # FLD AUTO: 6.54 K/UL — SIGNIFICANT CHANGE UP (ref 3.8–10.5)

## 2022-12-19 PROCEDURE — 99214 OFFICE O/P EST MOD 30 MIN: CPT

## 2022-12-19 NOTE — HISTORY OF PRESENT ILLNESS
[Disease: _____________________] : Disease: [unfilled] [T: ___] : T[unfilled] [N: ___] : N[unfilled] [de-identified] : Ms. MARCELINO ECHEVARRIA is a 82 year old F who presented to  [facility] (formerly [facility]) to transfer care on 12/19/22. She was initially seen at [facility] in 3/2010 when she was diagnosed with LCIS and minute focus of invasive right breast cancer. She underwent bilateral mastectomy with no reconstruction. She received therapy with AI for 2 years after which she underwent GENESIS/BSO. She has been clinically JAMES since.\par \par Over course of 2021 she has been followed by cardiology for valvular heart disease. She had stress test in 9/2022 which showed no abnormal findings. She received COVID booster vaccine #1 in 9/2022 but unfortunately contracted COVID infection in 10/2022 with persistent symptoms of fatigue since.\par \par She now presents on 12/19/22 to establish care at  [facility] (formerly [facility])  [de-identified] : ER+VT+her2 negative [de-identified] : 119/22\par All of the patient's prior records including radiology, pathology and prior notes reviewed; Past Medical History, Past Surgical History, Family History and Social history reviewed and updated in the patient's chart.  [100: Normal, no complaints, no evidence of disease.] : 100: Normal, no complaints, no evidence of disease.

## 2022-12-19 NOTE — REVIEW OF SYSTEMS
[Fatigue] : fatigue [Negative] : Allergic/Immunologic [FreeTextEntry2] : since COVID infection in 10/2022

## 2022-12-19 NOTE — PHYSICAL EXAM
[Normal] : affect appropriate [de-identified] : bilateral mastectomy scars with no chest wall lesions notes [de-identified] : no cervical, supraclav or axillary LAD

## 2022-12-19 NOTE — ASSESSMENT
[FreeTextEntry1] : 83 yo woman with history of bilateral LCIS with minute focus of invasive ductal carcinoma of the right breast diagnosed in 3/2010, underwent bilateral mastectomy and adjuvant AI for 2 years; opted to have GENESIS/BSO and not additional treatment since.\par \par - Has clinically remained JAMES\par - will check chem panel\par - discussed symptoms of long COVID with patient; strongly encouraged to follow up with cardiology as she may have cardiac effects from recent COVID infection as well\par - advised in future, if she gets COVID again, would be candidate for Paxlovid given advanced age; would need to stop lipitor for 10 days starting with day she starts Paxlovid; other treatment option included IV remdesivir; advised that at current time, Evusheld and monoclonal antibodies are not effective against circulating variants of COVID\par - Patient had the opportunity to have all their questions answered to their satisfaction \par - f/u annually or as needed

## 2023-07-27 ENCOUNTER — OUTPATIENT (OUTPATIENT)
Dept: OUTPATIENT SERVICES | Facility: HOSPITAL | Age: 83
LOS: 1 days | Discharge: HOME | End: 2023-07-27

## 2023-07-27 DIAGNOSIS — Z98.890 OTHER SPECIFIED POSTPROCEDURAL STATES: Chronic | ICD-10-CM

## 2023-07-27 DIAGNOSIS — D64.9 ANEMIA, UNSPECIFIED: ICD-10-CM

## 2023-08-03 ENCOUNTER — APPOINTMENT (OUTPATIENT)
Dept: INFUSION THERAPY | Facility: HOSPITAL | Age: 83
End: 2023-08-03

## 2023-08-04 DIAGNOSIS — M81.0 AGE-RELATED OSTEOPOROSIS WITHOUT CURRENT PATHOLOGICAL FRACTURE: ICD-10-CM

## 2023-08-15 ENCOUNTER — APPOINTMENT (OUTPATIENT)
Dept: PULMONOLOGY | Facility: CLINIC | Age: 83
End: 2023-08-15

## 2023-09-01 ENCOUNTER — NON-APPOINTMENT (OUTPATIENT)
Age: 83
End: 2023-09-01

## 2023-09-01 ENCOUNTER — APPOINTMENT (OUTPATIENT)
Dept: CARDIOLOGY | Facility: CLINIC | Age: 83
End: 2023-09-01
Payer: MEDICARE

## 2023-09-01 VITALS
HEART RATE: 57 BPM | WEIGHT: 147 LBS | DIASTOLIC BLOOD PRESSURE: 71 MMHG | OXYGEN SATURATION: 98 % | SYSTOLIC BLOOD PRESSURE: 149 MMHG | HEIGHT: 68.74 IN | BODY MASS INDEX: 21.77 KG/M2

## 2023-09-01 VITALS — DIASTOLIC BLOOD PRESSURE: 65 MMHG | SYSTOLIC BLOOD PRESSURE: 138 MMHG

## 2023-09-01 DIAGNOSIS — I34.0 NONRHEUMATIC MITRAL (VALVE) INSUFFICIENCY: ICD-10-CM

## 2023-09-01 DIAGNOSIS — R06.00 DYSPNEA, UNSPECIFIED: ICD-10-CM

## 2023-09-01 PROCEDURE — 99214 OFFICE O/P EST MOD 30 MIN: CPT

## 2023-09-01 PROCEDURE — 93000 ELECTROCARDIOGRAM COMPLETE: CPT

## 2023-09-01 RX ORDER — LEVOTHYROXINE SODIUM 25 UG/1
25 TABLET ORAL
Refills: 0 | Status: ACTIVE | COMMUNITY

## 2023-09-01 RX ORDER — SIMVASTATIN 40 MG/1
40 TABLET, FILM COATED ORAL
Refills: 0 | Status: ACTIVE | COMMUNITY

## 2023-09-01 NOTE — DISCUSSION/SUMMARY
[With Me] : with me [___ Month(s)] : in [unfilled] month(s) [FreeTextEntry1] : Mrs. Hinojosa is a 82-year-old female with breast CA status post mastectomy, osteoporosis, hypothyroidism, and hyperlipidemia, here for follow up evaluation.  At prior visits, she was sent for a cardiac cath because of exertional symptoms, and was found to have non-obstructive CAD. She has moderate AR and moderate MR on echocardiogram in 2022 with eccentric LVH.  She is reporting intermittent fatigue, though is still exercising/swimming.  Her BP is elevated, and I have asked her to begin checking at home. We will repeat an echocardiogram and a carotid doppler this year.  She remains in a SR today, without PVCs.  We will speak after the above testing and arrange follow up. [EKG obtained to assist in diagnosis and management of assessed problem(s)] : EKG obtained to assist in diagnosis and management of assessed problem(s)

## 2023-09-01 NOTE — HISTORY OF PRESENT ILLNESS
"Chief Complaint   Patient presents with     Prenatal Care     OB px       Initial /60 (BP Location: Right arm, Patient Position: Sitting, Cuff Size: Adult Regular)   Pulse 84   Resp 16   Wt 58.3 kg (128 lb 9.6 oz)   LMP 04/22/2019 (Exact Date)   BMI 23.52 kg/m   Estimated body mass index is 23.52 kg/m  as calculated from the following:    Height as of 2/27/19: 1.575 m (5' 2\").    Weight as of this encounter: 58.3 kg (128 lb 9.6 oz).  Medication Reconciliation: Completed     Chika Paez LPN  " [FreeTextEntry1] : Mrs. Hinojosa is a 81-year-old female with breast CA status post mastectomy, osteoporosis, hypothyroidism, and hyperlipidemia, here for follow up evaluation.   At prior visits, she reported a decrease in her exercise tolerance and exertional chest pain. She was sent for a cardiac catheterization which showed nonobstructive CAD, with a 20% mid LAD lesion. She was also sent for echocardiogram which showed bileaflet mitral valve prolapse with mild to moderate mitral regurgitation, and moderate aortic regurgitation. Her left ventricle systolic function was normal. Repeat echo in 9/020 with at moderate AI, and moderate MR with bileaflet prolapse. LV dimensions and LV function were normal. LE arterial doppler in 2021 with mild atherosclerosis. Carotids with mild atherosclerosis, though a prior study demonstrated a 50-69% stenosis of her right distal ICA.  I last saw her in 8/22.  At last visit she was reporting some dyspnea on exertion, fatigue and episodes of dizziness.  I repeated her echocardiogram which demonstrated bileaflet prolapse with moderate late systolic MR, moderate AI, and moderate dilated left atrium, eccentric LVH, normal LV function.  She had an exercise stress test during which she exercised for 12 minutes and 32 seconds, without worrisome EKG changes.  There were frequent PVCs during rest stress and recovery.  She also had a 2-week monitor which demonstrated sinus rhythm with an average heart rate of 61 bpm, with frequent PVCs, consisting of 12.24% of total beats.  There were also episodes of trigeminy, which correlated to her symptoms.  She is doing well today. 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 She has no swelling edema. Her breathing is at baseline.

## 2023-09-08 ENCOUNTER — APPOINTMENT (OUTPATIENT)
Dept: CARDIOLOGY | Facility: CLINIC | Age: 83
End: 2023-09-08
Payer: MEDICARE

## 2023-09-08 PROCEDURE — 93306 TTE W/DOPPLER COMPLETE: CPT

## 2023-09-08 PROCEDURE — 93880 EXTRACRANIAL BILAT STUDY: CPT

## 2023-11-30 ENCOUNTER — OUTPATIENT (OUTPATIENT)
Dept: OUTPATIENT SERVICES | Facility: HOSPITAL | Age: 83
LOS: 1 days | Discharge: HOME | End: 2023-11-30

## 2023-11-30 DIAGNOSIS — D64.9 ANEMIA, UNSPECIFIED: ICD-10-CM

## 2023-11-30 DIAGNOSIS — Z98.890 OTHER SPECIFIED POSTPROCEDURAL STATES: Chronic | ICD-10-CM

## 2023-12-15 ENCOUNTER — APPOINTMENT (OUTPATIENT)
Dept: HEMATOLOGY ONCOLOGY | Facility: CLINIC | Age: 83
End: 2023-12-15
Payer: MEDICARE

## 2023-12-15 VITALS
WEIGHT: 147.71 LBS | HEART RATE: 54 BPM | TEMPERATURE: 97 F | BODY MASS INDEX: 21.98 KG/M2 | OXYGEN SATURATION: 99 % | SYSTOLIC BLOOD PRESSURE: 137 MMHG | RESPIRATION RATE: 16 BRPM | DIASTOLIC BLOOD PRESSURE: 74 MMHG

## 2023-12-15 DIAGNOSIS — C50.919 MALIGNANT NEOPLASM OF UNSPECIFIED SITE OF UNSPECIFIED FEMALE BREAST: ICD-10-CM

## 2023-12-15 PROCEDURE — 99214 OFFICE O/P EST MOD 30 MIN: CPT

## 2023-12-17 NOTE — HISTORY OF PRESENT ILLNESS
[Disease: _____________________] : Disease: [unfilled] [T: ___] : T[unfilled] [N: ___] : N[unfilled] [100: Normal, no complaints, no evidence of disease.] : 100: Normal, no complaints, no evidence of disease. [de-identified] : Ms. MARCELINO ECHEVARRIA is a 83 year old F who presented to  [facility] (formerly [facility]) to transfer care on 12/19/22. She was initially seen at [facility] in 3/2010 when she was diagnosed with LCIS and minute focus of invasive right breast cancer. She underwent bilateral mastectomy with no reconstruction. She received therapy with AI for 2 years after which she underwent GENESIS/BSO. She has been clinically JAMES since.  Over course of 2021 she has been followed by cardiology for valvular heart disease. She had stress test in 9/2022 which showed no abnormal findings. She received COVID booster vaccine #1 in 9/2022 but unfortunately contracted COVID infection in 10/2022 with persistent symptoms of fatigue since.  Presented on 12/19/22 to establish care at  [facility] (formerly [facility])  [de-identified] : ER+NC+her2 negative [de-identified] : 12/15/23 Patient returns today to rule out progression or recurrence of breast cancer and to assess treatment toxicity. Also followed for osteoporosis; started on Prolia in 8/2023; planned for next injection in 2/2024 continues to excercise regularly; swims at least 3 times per week; coaches swimming as well

## 2023-12-17 NOTE — PHYSICAL EXAM
[Normal] : affect appropriate [Fully active, able to carry on all pre-disease performance without restriction] : Status 0 - Fully active, able to carry on all pre-disease performance without restriction [de-identified] : bilateral mastectomy scars with no chest wall lesions notes [de-identified] : no cervical, supraclav or axillary LAD

## 2023-12-17 NOTE — ASSESSMENT
[FreeTextEntry1] : 82 yo woman with history of bilateral LCIS with minute focus of invasive ductal carcinoma of the right breast diagnosed in 3/2010, underwent bilateral mastectomy and adjuvant AI for 2 years; opted to have GENESIS/BSO and not additional treatment since.  - Has clinically remained JAMES - remains on active surveillance for recurrence of invasive cancer - for osteoporosis documented 7/2023 - continue Prolia every 6 months; to have repeat bone density in 7/2025 - encouraged continued calcium/vitamin D - encouraged continued exercise  HEALTH MAINTENANCE SCREENING RECOMMENDATIONS PMD at least annually with lipid checks/bloodwork; following with Dr. Mehul MONTAÑO for colon cancer screening/colonoscopy at least every 10 years  dermatology for annual skin checks    - Patient had the opportunity to have all their questions answered to their satisfaction - will have lab check done in January 2024 (within 1 month of Prolia injection) and again in July 2024 - f/u annually or as needed.

## 2024-01-09 DIAGNOSIS — M81.0 AGE-RELATED OSTEOPOROSIS W/OUT CURRENT PATHOLOGICAL FRACTURE: ICD-10-CM

## 2024-01-09 LAB
25(OH)D3 SERPL-MCNC: 25.7 NG/ML
ALBUMIN SERPL ELPH-MCNC: 4.3 G/DL
ALP BLD-CCNC: 67 U/L
ALT SERPL-CCNC: 21 U/L
ANION GAP SERPL CALC-SCNC: 17 MMOL/L
AST SERPL-CCNC: 26 U/L
BASOPHILS # BLD AUTO: 0.04 K/UL
BASOPHILS NFR BLD AUTO: 0.5 %
BILIRUB SERPL-MCNC: 0.2 MG/DL
BUN SERPL-MCNC: 17 MG/DL
CALCIUM SERPL-MCNC: 8.8 MG/DL
CHLORIDE SERPL-SCNC: 105 MMOL/L
CO2 SERPL-SCNC: 20 MMOL/L
CREAT SERPL-MCNC: 0.91 MG/DL
EGFR: 63 ML/MIN/1.73M2
EOSINOPHIL # BLD AUTO: 0.1 K/UL
EOSINOPHIL NFR BLD AUTO: 1.2 %
GLUCOSE SERPL-MCNC: 71 MG/DL
HCT VFR BLD CALC: 37 %
HGB BLD-MCNC: 12.2 G/DL
IMM GRANULOCYTES NFR BLD AUTO: 0.2 %
LYMPHOCYTES # BLD AUTO: 2.9 K/UL
LYMPHOCYTES NFR BLD AUTO: 34.5 %
MAN DIFF?: NORMAL
MCHC RBC-ENTMCNC: 29.3 PG
MCHC RBC-ENTMCNC: 33 GM/DL
MCV RBC AUTO: 88.7 FL
MONOCYTES # BLD AUTO: 0.63 K/UL
MONOCYTES NFR BLD AUTO: 7.5 %
NEUTROPHILS # BLD AUTO: 4.71 K/UL
NEUTROPHILS NFR BLD AUTO: 56.1 %
PLATELET # BLD AUTO: 252 K/UL
POTASSIUM SERPL-SCNC: 4.5 MMOL/L
PROT SERPL-MCNC: 6.2 G/DL
RBC # BLD: 4.17 M/UL
RBC # FLD: 12.7 %
SODIUM SERPL-SCNC: 142 MMOL/L
WBC # FLD AUTO: 8.4 K/UL

## 2024-01-09 RX ORDER — ERGOCALCIFEROL 1.25 MG/1
1.25 MG CAPSULE, LIQUID FILLED ORAL
Qty: 13 | Refills: 0 | Status: ACTIVE | COMMUNITY
Start: 2024-01-09 | End: 1900-01-01

## 2024-01-25 ENCOUNTER — OUTPATIENT (OUTPATIENT)
Dept: OUTPATIENT SERVICES | Facility: HOSPITAL | Age: 84
LOS: 1 days | Discharge: HOME | End: 2024-01-25

## 2024-01-25 DIAGNOSIS — Z98.890 OTHER SPECIFIED POSTPROCEDURAL STATES: Chronic | ICD-10-CM

## 2024-01-25 DIAGNOSIS — D64.9 ANEMIA, UNSPECIFIED: ICD-10-CM

## 2024-02-05 ENCOUNTER — APPOINTMENT (OUTPATIENT)
Dept: INFUSION THERAPY | Facility: HOSPITAL | Age: 84
End: 2024-02-05

## 2024-02-06 ENCOUNTER — NON-APPOINTMENT (OUTPATIENT)
Age: 84
End: 2024-02-06

## 2024-02-12 ENCOUNTER — APPOINTMENT (OUTPATIENT)
Dept: INFUSION THERAPY | Facility: HOSPITAL | Age: 84
End: 2024-02-12

## 2024-02-13 DIAGNOSIS — M81.0 AGE-RELATED OSTEOPOROSIS WITHOUT CURRENT PATHOLOGICAL FRACTURE: ICD-10-CM

## 2024-08-12 ENCOUNTER — OUTPATIENT (OUTPATIENT)
Dept: OUTPATIENT SERVICES | Facility: HOSPITAL | Age: 84
LOS: 1 days | Discharge: HOME | End: 2024-08-12

## 2024-08-12 DIAGNOSIS — D64.9 ANEMIA, UNSPECIFIED: ICD-10-CM

## 2024-08-12 DIAGNOSIS — Z98.890 OTHER SPECIFIED POSTPROCEDURAL STATES: Chronic | ICD-10-CM

## 2024-08-15 DIAGNOSIS — C50.919 MALIGNANT NEOPLASM OF UNSPECIFIED SITE OF UNSPECIFIED FEMALE BREAST: ICD-10-CM

## 2024-08-20 ENCOUNTER — APPOINTMENT (OUTPATIENT)
Dept: INFUSION THERAPY | Facility: HOSPITAL | Age: 84
End: 2024-08-20

## 2024-08-21 DIAGNOSIS — M81.0 AGE-RELATED OSTEOPOROSIS WITHOUT CURRENT PATHOLOGICAL FRACTURE: ICD-10-CM

## 2024-09-20 ENCOUNTER — APPOINTMENT (OUTPATIENT)
Dept: CARDIOLOGY | Facility: CLINIC | Age: 84
End: 2024-09-20
Payer: MEDICARE

## 2024-09-20 ENCOUNTER — NON-APPOINTMENT (OUTPATIENT)
Age: 84
End: 2024-09-20

## 2024-09-20 VITALS — BODY MASS INDEX: 21.77 KG/M2 | OXYGEN SATURATION: 98 % | WEIGHT: 147 LBS | HEIGHT: 68.74 IN | HEART RATE: 54 BPM

## 2024-09-20 VITALS — DIASTOLIC BLOOD PRESSURE: 78 MMHG | SYSTOLIC BLOOD PRESSURE: 138 MMHG

## 2024-09-20 DIAGNOSIS — I35.1 NONRHEUMATIC AORTIC (VALVE) INSUFFICIENCY: ICD-10-CM

## 2024-09-20 DIAGNOSIS — R06.00 DYSPNEA, UNSPECIFIED: ICD-10-CM

## 2024-09-20 PROCEDURE — 99214 OFFICE O/P EST MOD 30 MIN: CPT

## 2024-09-20 PROCEDURE — 93000 ELECTROCARDIOGRAM COMPLETE: CPT

## 2024-09-20 NOTE — PHYSICAL EXAM
[Well Developed] : well developed [Well Nourished] : well nourished [No Acute Distress] : no acute distress [Normal Conjunctiva] : normal conjunctiva [Normal Venous Pressure] : normal venous pressure [No Carotid Bruit] : no carotid bruit [Normal Rate] : normal [Normal S1] : normal S1 [Normal S2] : normal S2 [S3] : no S3 [S4] : no S4 [II] : a grade 2 [I] : a grade 1 [No Pitting Edema] : no pitting edema present [Right Carotid Bruit] : no bruit heard over the right carotid [Left Carotid Bruit] : no bruit heard over the left carotid [Right Femoral Bruit] : no bruit heard over the right femoral artery [Left Femoral Bruit] : no bruit heard over the left femoral artery [2+] : right 2+ [1+] : left 1+ [No Abnormalities] : the abdominal aorta was not enlarged and no bruit was heard [Clear Lung Fields] : clear lung fields [Good Air Entry] : good air entry [No Respiratory Distress] : no respiratory distress  [Soft] : abdomen soft [Non Tender] : non-tender [No Masses/organomegaly] : no masses/organomegaly [Normal Bowel Sounds] : normal bowel sounds [Normal Gait] : normal gait [No Edema] : no edema [No Cyanosis] : no cyanosis [No Clubbing] : no clubbing [No Varicosities] : no varicosities [No Rash] : no rash [No Skin Lesions] : no skin lesions [Moves all extremities] : moves all extremities [No Focal Deficits] : no focal deficits [Normal Speech] : normal speech [Alert and Oriented] : alert and oriented [Normal memory] : normal memory

## 2024-09-20 NOTE — HISTORY OF PRESENT ILLNESS
[FreeTextEntry1] : Mrs. Hinojosa is a 83-year-old female with breast CA status post mastectomy, osteoporosis, hypothyroidism, and hyperlipidemia, here for follow up evaluation.  At prior visits, she reported a decrease in her exercise tolerance and exertional chest pain. She was sent for a cardiac catheterization which showed nonobstructive CAD, with a 20% mid LAD lesion. She was also sent for echocardiogram which showed bileaflet mitral valve prolapse with mild to moderate mitral regurgitation, and moderate aortic regurgitation. Her left ventricle systolic function was normal. Repeat echo in 9/020 with at moderate AI, and moderate MR with bileaflet prolapse. LV dimensions and LV function were normal. LE arterial doppler in 2021 with mild atherosclerosis. Carotids with mild atherosclerosis, though a prior study demonstrated a 50-69% stenosis of her right distal ICA.  I last saw her in 9/23  In 2022, she was reporting some dyspnea on exertion, fatigue and episodes of dizziness.  I repeated her echocardiogram which demonstrated bileaflet prolapse with moderate late systolic MR, moderate AI, and moderate dilated left atrium, eccentric LVH, normal LV function.  She had an exercise stress test during which she exercised for 12 minutes and 32 seconds, without worrisome EKG changes.  There were frequent PVCs during rest stress and recovery.  She also had a 2-week monitor which demonstrated sinus rhythm with an average heart rate of 61 bpm, with frequent PVCs, consisting of 12.24% of total beats.  There were also episodes of trigeminy, which correlated to her symptoms.  She is doing well today. 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 She has no swelling edema. Her breathing is at baseline.  Last echo in 9/23 with ef 58%, moderate AI, mild to mod MR

## 2024-09-20 NOTE — DISCUSSION/SUMMARY
[With Me] : with me [___ Month(s)] : in [unfilled] month(s) [FreeTextEntry1] : Mrs. Hinojosa is 83-year-old female with breast CA status post mastectomy, osteoporosis, hypothyroidism, and hyperlipidemia, here for follow up evaluation.  At prior visits, she was sent for a cardiac catheterization because of exertional symptoms and was found to have non-obstructive CAD. She has moderate AR and moderate MR on echocardiogram in 2022 with eccentric LVH.  She is reporting intermittent fatigue, though is still exercising/swimming.  Her BP is acceptable, and she will start checking at home. We will repeat an echocardiogram this year to evaluate her AR.  She remains in a SR with RBBB, unchanged from prior tracings. She will continue simvastatin and ASA.  We will speak after the above testing and arrange follow up. If no issues, I will see her again in 6 months. [EKG obtained to assist in diagnosis and management of assessed problem(s)] : EKG obtained to assist in diagnosis and management of assessed problem(s)

## 2024-09-24 ENCOUNTER — APPOINTMENT (OUTPATIENT)
Dept: CARDIOLOGY | Facility: CLINIC | Age: 84
End: 2024-09-24
Payer: MEDICARE

## 2024-09-24 PROCEDURE — 93306 TTE W/DOPPLER COMPLETE: CPT

## 2024-12-13 ENCOUNTER — APPOINTMENT (OUTPATIENT)
Dept: HEMATOLOGY ONCOLOGY | Facility: CLINIC | Age: 84
End: 2024-12-13

## 2025-02-18 ENCOUNTER — OUTPATIENT (OUTPATIENT)
Dept: OUTPATIENT SERVICES | Facility: HOSPITAL | Age: 85
LOS: 1 days | Discharge: HOME | End: 2025-02-18

## 2025-02-18 DIAGNOSIS — D64.9 ANEMIA, UNSPECIFIED: ICD-10-CM

## 2025-02-18 DIAGNOSIS — Z98.890 OTHER SPECIFIED POSTPROCEDURAL STATES: Chronic | ICD-10-CM

## 2025-02-20 ENCOUNTER — APPOINTMENT (OUTPATIENT)
Dept: INFUSION THERAPY | Facility: HOSPITAL | Age: 85
End: 2025-02-20

## 2025-02-20 ENCOUNTER — RESULT REVIEW (OUTPATIENT)
Age: 85
End: 2025-02-20

## 2025-02-20 ENCOUNTER — NON-APPOINTMENT (OUTPATIENT)
Age: 85
End: 2025-02-20

## 2025-02-20 ENCOUNTER — APPOINTMENT (OUTPATIENT)
Dept: HEMATOLOGY ONCOLOGY | Facility: CLINIC | Age: 85
End: 2025-02-20
Payer: MEDICARE

## 2025-02-20 VITALS
TEMPERATURE: 97.8 F | OXYGEN SATURATION: 98 % | HEART RATE: 53 BPM | WEIGHT: 143.3 LBS | DIASTOLIC BLOOD PRESSURE: 79 MMHG | SYSTOLIC BLOOD PRESSURE: 155 MMHG | BODY MASS INDEX: 21.32 KG/M2 | RESPIRATION RATE: 16 BRPM

## 2025-02-20 DIAGNOSIS — M81.0 AGE-RELATED OSTEOPOROSIS W/OUT CURRENT PATHOLOGICAL FRACTURE: ICD-10-CM

## 2025-02-20 DIAGNOSIS — C50.919 MALIGNANT NEOPLASM OF UNSPECIFIED SITE OF UNSPECIFIED FEMALE BREAST: ICD-10-CM

## 2025-02-20 LAB
25(OH)D3 SERPL-MCNC: 34.7 NG/ML
ALBUMIN SERPL ELPH-MCNC: 4.4 G/DL
ALP BLD-CCNC: 68 U/L
ALT SERPL-CCNC: 16 U/L
ANION GAP SERPL CALC-SCNC: 11 MMOL/L
AST SERPL-CCNC: 24 U/L
BASOPHILS # BLD AUTO: 0.02 K/UL — SIGNIFICANT CHANGE UP (ref 0–0.2)
BASOPHILS NFR BLD AUTO: 0.4 % — SIGNIFICANT CHANGE UP (ref 0–2)
BILIRUB SERPL-MCNC: 0.4 MG/DL
BUN SERPL-MCNC: 21 MG/DL
CALCIUM SERPL-MCNC: 9.2 MG/DL
CHLORIDE SERPL-SCNC: 105 MMOL/L
CO2 SERPL-SCNC: 27 MMOL/L
CREAT SERPL-MCNC: 0.87 MG/DL
EGFR: 66 ML/MIN/1.73M2
EOSINOPHIL # BLD AUTO: 0.15 K/UL — SIGNIFICANT CHANGE UP (ref 0–0.5)
EOSINOPHIL NFR BLD AUTO: 2.7 % — SIGNIFICANT CHANGE UP (ref 0–6)
GLUCOSE SERPL-MCNC: 102 MG/DL
HCT VFR BLD CALC: 40.3 % — SIGNIFICANT CHANGE UP (ref 34.5–45)
HGB BLD-MCNC: 13.4 G/DL — SIGNIFICANT CHANGE UP (ref 11.5–15.5)
IMM GRANULOCYTES NFR BLD AUTO: 0.2 % — SIGNIFICANT CHANGE UP (ref 0–0.9)
LYMPHOCYTES # BLD AUTO: 2.01 K/UL — SIGNIFICANT CHANGE UP (ref 1–3.3)
LYMPHOCYTES # BLD AUTO: 36.3 % — SIGNIFICANT CHANGE UP (ref 13–44)
MCHC RBC-ENTMCNC: 29.5 PG — SIGNIFICANT CHANGE UP (ref 27–34)
MCHC RBC-ENTMCNC: 33.3 G/DL — SIGNIFICANT CHANGE UP (ref 32–36)
MCV RBC AUTO: 88.6 FL — SIGNIFICANT CHANGE UP (ref 80–100)
MONOCYTES # BLD AUTO: 0.46 K/UL — SIGNIFICANT CHANGE UP (ref 0–0.9)
MONOCYTES NFR BLD AUTO: 8.3 % — SIGNIFICANT CHANGE UP (ref 2–14)
NEUTROPHILS # BLD AUTO: 2.89 K/UL — SIGNIFICANT CHANGE UP (ref 1.8–7.4)
NEUTROPHILS NFR BLD AUTO: 52.1 % — SIGNIFICANT CHANGE UP (ref 43–77)
NRBC BLD AUTO-RTO: 0 /100 WBCS — SIGNIFICANT CHANGE UP (ref 0–0)
PLATELET # BLD AUTO: 252 K/UL — SIGNIFICANT CHANGE UP (ref 150–400)
POTASSIUM SERPL-SCNC: 4.3 MMOL/L
PROT SERPL-MCNC: 6.6 G/DL
RBC # BLD: 4.55 M/UL — SIGNIFICANT CHANGE UP (ref 3.8–5.2)
RBC # FLD: 12.3 % — SIGNIFICANT CHANGE UP (ref 10.3–14.5)
SODIUM SERPL-SCNC: 143 MMOL/L
WBC # BLD: 5.54 K/UL — SIGNIFICANT CHANGE UP (ref 3.8–10.5)
WBC # FLD AUTO: 5.54 K/UL — SIGNIFICANT CHANGE UP (ref 3.8–10.5)

## 2025-02-20 PROCEDURE — 99214 OFFICE O/P EST MOD 30 MIN: CPT

## 2025-02-21 DIAGNOSIS — M81.0 AGE-RELATED OSTEOPOROSIS WITHOUT CURRENT PATHOLOGICAL FRACTURE: ICD-10-CM

## 2025-03-25 ENCOUNTER — APPOINTMENT (OUTPATIENT)
Dept: CARDIOLOGY | Facility: CLINIC | Age: 85
End: 2025-03-25
Payer: MEDICARE

## 2025-03-25 VITALS — SYSTOLIC BLOOD PRESSURE: 120 MMHG | DIASTOLIC BLOOD PRESSURE: 70 MMHG

## 2025-03-25 VITALS
HEIGHT: 69 IN | HEART RATE: 56 BPM | BODY MASS INDEX: 21.77 KG/M2 | DIASTOLIC BLOOD PRESSURE: 69 MMHG | OXYGEN SATURATION: 99 % | WEIGHT: 147 LBS | SYSTOLIC BLOOD PRESSURE: 164 MMHG

## 2025-03-25 DIAGNOSIS — I25.10 ATHEROSCLEROTIC HEART DISEASE OF NATIVE CORONARY ARTERY W/OUT ANGINA PECTORIS: ICD-10-CM

## 2025-03-25 DIAGNOSIS — E78.5 HYPERLIPIDEMIA, UNSPECIFIED: ICD-10-CM

## 2025-03-25 DIAGNOSIS — I34.0 NONRHEUMATIC MITRAL (VALVE) INSUFFICIENCY: ICD-10-CM

## 2025-03-25 DIAGNOSIS — R03.0 ELEVATED BLOOD-PRESSURE READING, W/OUT DIAGNOSIS OF HYPERTENSION: ICD-10-CM

## 2025-03-25 DIAGNOSIS — R53.83 OTHER FATIGUE: ICD-10-CM

## 2025-03-25 DIAGNOSIS — I35.1 NONRHEUMATIC AORTIC (VALVE) INSUFFICIENCY: ICD-10-CM

## 2025-03-25 PROCEDURE — 93000 ELECTROCARDIOGRAM COMPLETE: CPT

## 2025-03-25 PROCEDURE — 99214 OFFICE O/P EST MOD 30 MIN: CPT

## 2025-04-03 ENCOUNTER — APPOINTMENT (OUTPATIENT)
Dept: CARDIOLOGY | Facility: CLINIC | Age: 85
End: 2025-04-03

## 2025-04-03 PROCEDURE — 93790 AMBL BP MNTR W/SW I&R: CPT

## 2025-04-10 ENCOUNTER — NON-APPOINTMENT (OUTPATIENT)
Age: 85
End: 2025-04-10

## 2025-07-15 NOTE — PRE-ANESTHESIA EVALUATION ADULT - NSANTHASARD_GEN_ALL_CORE
7/15/2025    [address]  [place] [address]        Clinic Note    Lul Thomas  : 1963  PCP: Federica Jamison DO    Reason for Visit:     Chief Complaint   Patient presents with   • Follow-up       History of Present Illness:   Lul Thomas is a 61 year old woman with PMHx of non-obstructive CAD per CTA, PAF, HTN, HLD who  on  was admitted to [facility] with palpitations and presyncope.   Patient is Faroese states that for the p prior ast several days she has been having episodes of weakness and near syncope.  Denies ever experiencing this in the past.  Has a known history of paroxysmal atrial fibrillation, nonobstructive CAD per coronary CTA and used to follow with an outside cardiologist. Faroese  was used during this encounter.    Previously she admits to not being compliant with medications however recently she has been taking her medications as instructed.  She called 911 due to persistent symptoms and presented to the ER for further evaluation.  When she was brought to [facility] vitals significant for tachycardia.  ECG was done which showed A-fib with RVR.  Per ER documentation she had multiple episodes of pauses where her heart rate would drop and patient was symptomatic with near syncopal-like episodes.  Chest x-ray with no acute process.  She was given IV fluids, oral diltiazem.  She admitted for further workup and management     While she was in the hospital she was seen by Dr. Young and her flecainide was stopped due to the nonobstructive CAD seen on CTA and she was switched to Multaq.  Echocardiogram showed an EF of 70% with no significant valvular abnormalities.  Nuclear stress test was negative for ischemia with a normal EF.  Patient was subsequently discharged with a 5-day Zio patch. This showed paroxysmal A-fib/a flutter.  Overall burden was 30%.  Heart rate ranged from 66 to 190 bpm with average heart rate 113  bpm.  She also had 2 conversion pauses.  1 pause was 5.5 seconds at 7:22 AM.  Another pause was 3.8 seconds at 6:31 AM.  She is supposed to be on both Cardizem as well as Multaq but stopped them as she felt that they were making things worse.    She used to see Dr. Ariel Gonzalez and was actually supposed to have an A-fib ablation back in November but subsequently canceled the procedure as she did not really think she needed at that time and wanted to know why she was having A-fib.  She presents today with her daughter.  She states that after leaving the hospital she was feeling lousy.  However over the past 2 weeks she has been feeling a lot better after stopping her medications.  She remains on Eliquis and denies any bleeding.  She does mention that all of this seems to start about a year ago after she had a very stressful situation.  She also states that she had COVID a few years ago which may have made things worse as well.    EKG performed today shows sinus rhythm heart rate of 70 bpm.  PMHx:     Past Medical History:   Diagnosis Date   • Essential (primary) hypertension        PSHx:   No past surgical history on file.    Family Hx:   No family history on file.    Social Hx:     Social History     Tobacco Use   • Smoking status: Never   • Smokeless tobacco: Never   Substance Use Topics   • Alcohol use: Not Currently   • Drug use: Not Currently       Allergies:    ALLERGIES:  No Known Allergies    Medications:     Current Outpatient Medications   Medication Sig Dispense Refill   • apixaBAN (ELIQUIS) 5 MG Tab Take 5 mg by mouth every 12 hours.     • lisinopril (ZESTRIL) 20 MG tablet Take 20 mg by mouth daily.     • VALERIAN ROOT PO Take 1 tablet by mouth as needed (When feeling poorly).     • NON FORMULARY Take 1 Dose by mouth as directed. Heart vitamins from [facility]     • atorvastatin (LIPITOR) 10 MG tablet Take 1 tablet by mouth nightly. 30 tablet 5   • dilTIAZem (Cardizem CD) 120 MG 24 hr capsule Take 1 capsule  by mouth daily. 30 capsule 5   • dronedarone (MULTAQ) 400 MG Tab Take 1 tablet by mouth in the morning and 1 tablet in the evening. 60 tablet 5     No current facility-administered medications for this visit.       Review of Systems:   ROS Eye Problem(s):negative  ENT Problem(s):negative  Cardiovascular problem(s):negative  Respiratory problem(s):negative  Gastro-intestinal problem(s):negative GI  Genito-urinary problem(s):negative  Musculoskeletal problem(s):negative  Integumentary problem(s):negative  Neurological problem(s):negative  Psychiatric problem(s):negative  Endocrine problem(s):negative  Hematologic and/or Lymphatic problem(s):negative      Physical Exam:   Vitals: Visit Vitals  /78 (BP Location: LUE - Left upper extremity, Patient Position: Sitting, Cuff Size: Regular)   Pulse 78   SpO2 98%      General:  No acute distress.   HEENT: Mucosa moist, no scleral icterus   Lungs: Cear to auscultation   Cardiac: JVP normal. No carotid bruit.    RRR,    No edema   Pulses:  Pedal pulses nl     Femoral pulses nl    Carotid pulses nl   Abdomen: Soft, non tender, difficult to assess abd aorta   Musculoskeletal: Gait normal. No tendon xanthoma.    Skin: Warm, no cyanosis.   Neuro: A & O   Psychiatric: Normal affect.        Labs:   No results found for: \"CHOLESTEROL\"   No results found for: \"CALCLDL\"   No results found for: \"HDL\"   No results found for: \"TRIGLYCERIDE\"     GOT/AST (Units/L)   Date Value   06/24/2025 11      No results found for: \"ALT\"    No results for input(s): \"CHOLESTEROL\", \"CALCLDL\", \"HDL\", \"TRIGLYCERIDE\", \"AST\", \"ALT\" in the last 72 hours.      Impression:         1. Paroxysmal atrial fibrillation with rapid ventricular response  (CMD)    2. Tachy-luis syndrome  (CMD)         61-year-old female with a history of nonobstructive CAD, hypertension, paroxysmal A-fib, and symptomatic tachybradycardia syndrome presents for EP follow-up.    Plan:   1.  At this point in time would recommend a  pacemaker for symptomatic tachybradycardia syndrome.  Risk and benefits the patient were discussed at length with the patient.  Risk include but not limited to bleeding, infection, vascular complication, card perforation, MI, stroke, death.  At this point in time patient is very adamant about not getting a pacemaker.  She has a number of questions regarding her A-fib including why she is having A-fib.  She seems to focus a lot on other potential reasons for having A-fib.  She has some renal cysts as well as some mild atelectasis on chest x-ray she which she thinks may be contributing.  She also thinks that her prior COVID as well as possibly her stressful situation a year ago may have triggered the A-fib and wants to wait and see if things continue to improve now that she gets further away from these episodes.  I strongly encouraged her that she had multiple pauses when she was in the ER as well as a few pauses on this monitor and those pauses make it very difficult to treat her A-fib because her rates are fast during the A-fib otherwise.  Patient continue to ask a number of questions and even repeated her same questions a few times.  Patient again does not want a pacemaker.  Patient would prefer another monitor to see if she continues to have bouts of A-fib as well as pauses.  I again told her that my recommendation would not change even if the monitor looked good but at this point time she patient is adamant about having another monitor.  I had offered the patient to get a second opinion as patient seemed relatively unhappy with my recommendations.  2.  I did tell the patient that if she were to get a pacemaker would probably put her on additional antibiotics such as amiodarone which has a number of side effects and we can always discuss an A-fib ablation down the road.  Again she asked a number of questions multiple times regarding why she needs a pacemaker and really wants to wait as she does not want a  pacemaker at this point in time.  3.  Patient will wear the 14-day Zio patch and we will call her with the results.      Ronni Rodriguez MD  07/16/25   3

## 2025-08-20 ENCOUNTER — APPOINTMENT (OUTPATIENT)
Dept: INFUSION THERAPY | Facility: HOSPITAL | Age: 85
End: 2025-08-20

## 2025-08-20 ENCOUNTER — APPOINTMENT (OUTPATIENT)
Dept: HEMATOLOGY ONCOLOGY | Facility: CLINIC | Age: 85
End: 2025-08-20

## 2025-08-20 DIAGNOSIS — C50.919 MALIGNANT NEOPLASM OF UNSPECIFIED SITE OF UNSPECIFIED FEMALE BREAST: ICD-10-CM

## 2025-09-02 ENCOUNTER — APPOINTMENT (OUTPATIENT)
Dept: CARDIOLOGY | Facility: CLINIC | Age: 85
End: 2025-09-02
Payer: MEDICARE

## 2025-09-02 VITALS
HEIGHT: 69 IN | WEIGHT: 148 LBS | SYSTOLIC BLOOD PRESSURE: 131 MMHG | OXYGEN SATURATION: 96 % | BODY MASS INDEX: 21.92 KG/M2 | DIASTOLIC BLOOD PRESSURE: 67 MMHG | HEART RATE: 55 BPM

## 2025-09-02 DIAGNOSIS — R06.00 DYSPNEA, UNSPECIFIED: ICD-10-CM

## 2025-09-02 DIAGNOSIS — R94.31 ABNORMAL ELECTROCARDIOGRAM [ECG] [EKG]: ICD-10-CM

## 2025-09-02 DIAGNOSIS — I35.1 NONRHEUMATIC AORTIC (VALVE) INSUFFICIENCY: ICD-10-CM

## 2025-09-02 PROCEDURE — 93000 ELECTROCARDIOGRAM COMPLETE: CPT

## 2025-09-02 PROCEDURE — 99214 OFFICE O/P EST MOD 30 MIN: CPT

## 2025-09-18 ENCOUNTER — APPOINTMENT (OUTPATIENT)
Dept: CARDIOLOGY | Facility: CLINIC | Age: 85
End: 2025-09-18
Payer: MEDICARE

## 2025-09-18 PROCEDURE — 93306 TTE W/DOPPLER COMPLETE: CPT
